# Patient Record
Sex: MALE | Race: BLACK OR AFRICAN AMERICAN | NOT HISPANIC OR LATINO
[De-identification: names, ages, dates, MRNs, and addresses within clinical notes are randomized per-mention and may not be internally consistent; named-entity substitution may affect disease eponyms.]

---

## 2017-06-01 ENCOUNTER — APPOINTMENT (OUTPATIENT)
Dept: INTERNAL MEDICINE | Facility: CLINIC | Age: 57
End: 2017-06-01

## 2017-06-01 VITALS
RESPIRATION RATE: 12 BRPM | WEIGHT: 164 LBS | OXYGEN SATURATION: 99 % | BODY MASS INDEX: 22.96 KG/M2 | HEIGHT: 71 IN | HEART RATE: 98 BPM | TEMPERATURE: 98.8 F | DIASTOLIC BLOOD PRESSURE: 88 MMHG | SYSTOLIC BLOOD PRESSURE: 138 MMHG

## 2017-06-01 DIAGNOSIS — K64.4 RESIDUAL HEMORRHOIDAL SKIN TAGS: ICD-10-CM

## 2017-08-04 ENCOUNTER — APPOINTMENT (OUTPATIENT)
Dept: GASTROENTEROLOGY | Facility: CLINIC | Age: 57
End: 2017-08-04

## 2017-08-04 DIAGNOSIS — M25.569 PAIN IN UNSPECIFIED KNEE: ICD-10-CM

## 2017-09-12 ENCOUNTER — APPOINTMENT (OUTPATIENT)
Dept: ORTHOPEDIC SURGERY | Facility: CLINIC | Age: 57
End: 2017-09-12

## 2019-11-14 ENCOUNTER — APPOINTMENT (OUTPATIENT)
Dept: INTERNAL MEDICINE | Facility: CLINIC | Age: 59
End: 2019-11-14
Payer: MEDICAID

## 2019-11-14 VITALS — SYSTOLIC BLOOD PRESSURE: 120 MMHG | DIASTOLIC BLOOD PRESSURE: 75 MMHG

## 2019-11-14 VITALS
WEIGHT: 166 LBS | SYSTOLIC BLOOD PRESSURE: 158 MMHG | RESPIRATION RATE: 12 BRPM | TEMPERATURE: 98.3 F | DIASTOLIC BLOOD PRESSURE: 78 MMHG | BODY MASS INDEX: 23.24 KG/M2 | HEIGHT: 71 IN | HEART RATE: 102 BPM | OXYGEN SATURATION: 99 %

## 2019-11-14 DIAGNOSIS — Z00.00 ENCOUNTER FOR GENERAL ADULT MEDICAL EXAMINATION W/OUT ABNORMAL FINDINGS: ICD-10-CM

## 2019-11-14 DIAGNOSIS — N48.9 DISORDER OF PENIS, UNSPECIFIED: ICD-10-CM

## 2019-11-14 PROCEDURE — G0008: CPT

## 2019-11-14 PROCEDURE — 99214 OFFICE O/P EST MOD 30 MIN: CPT | Mod: 25

## 2019-11-14 PROCEDURE — 90682 RIV4 VACC RECOMBINANT DNA IM: CPT

## 2019-11-14 NOTE — PHYSICAL EXAM
[No Acute Distress] : no acute distress [Well Nourished] : well nourished [Well Developed] : well developed [PERRL] : pupils equal round and reactive to light [Well-Appearing] : well-appearing [Normal Sclera/Conjunctiva] : normal sclera/conjunctiva [EOMI] : extraocular movements intact [Normal Outer Ear/Nose] : the outer ears and nose were normal in appearance [Normal Oropharynx] : the oropharynx was normal [No JVD] : no jugular venous distention [No Lymphadenopathy] : no lymphadenopathy [Supple] : supple [Thyroid Normal, No Nodules] : the thyroid was normal and there were no nodules present [No Respiratory Distress] : no respiratory distress  [No Accessory Muscle Use] : no accessory muscle use [Clear to Auscultation] : lungs were clear to auscultation bilaterally [Normal Rate] : normal rate  [Regular Rhythm] : with a regular rhythm [Normal S1, S2] : normal S1 and S2 [II] : a grade 2 [No Carotid Bruits] : no carotid bruits [No Varicosities] : no varicosities [No Abdominal Bruit] : a ~M bruit was not heard ~T in the abdomen [Pedal Pulses Present] : the pedal pulses are present [No Edema] : there was no peripheral edema [No Palpable Aorta] : no palpable aorta [Soft] : abdomen soft [No Extremity Clubbing/Cyanosis] : no extremity clubbing/cyanosis [Non-distended] : non-distended [Non Tender] : non-tender [No Masses] : no abdominal mass palpated [No HSM] : no HSM [Normal Bowel Sounds] : normal bowel sounds [Normal Posterior Cervical Nodes] : no posterior cervical lymphadenopathy [No CVA Tenderness] : no CVA  tenderness [Normal Anterior Cervical Nodes] : no anterior cervical lymphadenopathy [No Joint Swelling] : no joint swelling [No Spinal Tenderness] : no spinal tenderness [Grossly Normal Strength/Tone] : grossly normal strength/tone [Coordination Grossly Intact] : coordination grossly intact [No Rash] : no rash [No Focal Deficits] : no focal deficits [Deep Tendon Reflexes (DTR)] : deep tendon reflexes were 2+ and symmetric [Normal Gait] : normal gait [Normal Insight/Judgement] : insight and judgment were intact [Normal Affect] : the affect was normal

## 2019-11-25 ENCOUNTER — APPOINTMENT (OUTPATIENT)
Dept: UROLOGY | Facility: CLINIC | Age: 59
End: 2019-11-25

## 2019-12-09 ENCOUNTER — APPOINTMENT (OUTPATIENT)
Dept: UROLOGY | Facility: CLINIC | Age: 59
End: 2019-12-09

## 2020-03-16 ENCOUNTER — APPOINTMENT (OUTPATIENT)
Dept: INTERNAL MEDICINE | Facility: CLINIC | Age: 60
End: 2020-03-16

## 2020-11-04 ENCOUNTER — EMERGENCY (EMERGENCY)
Facility: HOSPITAL | Age: 60
LOS: 1 days | Discharge: HOME | End: 2020-11-04
Attending: EMERGENCY MEDICINE | Admitting: EMERGENCY MEDICINE
Payer: MEDICAID

## 2020-11-04 VITALS
SYSTOLIC BLOOD PRESSURE: 105 MMHG | DIASTOLIC BLOOD PRESSURE: 75 MMHG | OXYGEN SATURATION: 98 % | RESPIRATION RATE: 20 BRPM | HEART RATE: 115 BPM | TEMPERATURE: 98 F

## 2020-11-04 PROCEDURE — 99283 EMERGENCY DEPT VISIT LOW MDM: CPT | Mod: 25

## 2020-11-04 PROCEDURE — 12002 RPR S/N/AX/GEN/TRNK2.6-7.5CM: CPT

## 2020-11-04 NOTE — ED PROVIDER NOTE - PATIENT PORTAL LINK FT
You can access the FollowMyHealth Patient Portal offered by [facility] by registering at the following website: [email or web address]. By joining [employer]’s FollowMyHealth portal, you will also be able to view your health information using other applications (apps) compatible with our system.

## 2020-11-04 NOTE — ED PROVIDER NOTE - NSFOLLOWUPINSTRUCTIONS_ED_ALL_ED_FT
Laceration    A laceration is a cut that goes through all of the layers of the skin and into the tissue that is right under the skin. Some lacerations heal on their own. Others need to be closed with skin adhesive strips, skin glue, stitches (sutures), or staples. Proper laceration care minimizes the risk of infection and helps the laceration to heal better.  If non-absorbable stitches or staples have been placed, they must be taken out within the time frame instructed by your healthcare provider.    Instructions:   Keep area clean and dry. Do not shower for 24 hours.   Return to ED in 7-10days for removal.     SEEK IMMEDIATE MEDICAL CARE IF YOU HAVE ANY OF THE FOLLOWING SYMPTOMS: swelling around the wound, worsening pain, drainage from the wound, red streaking going away from your wound, inability to move finger or toe near the laceration, or discoloration of skin near the laceration.
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
## 2020-11-04 NOTE — ED PROVIDER NOTE - PROGRESS NOTE DETAILS
Danny: Wound irrigated and 3 staples put into place. S&S of infxns discussed along with dispo instructions.

## 2020-11-04 NOTE — ED PROVIDER NOTE - NS_ ATTENDINGSCRIBEDETAILS _ED_A_ED_FT
I saw and evaluated the patient myself. The scribe entered the note based on my observations and dictation. I have reviewed the scribe's note.

## 2020-11-19 ENCOUNTER — EMERGENCY (EMERGENCY)
Facility: HOSPITAL | Age: 60
LOS: 1 days | Discharge: HOME | End: 2020-11-19
Attending: EMERGENCY MEDICINE | Admitting: EMERGENCY MEDICINE
Payer: MEDICAID

## 2020-11-19 VITALS
HEART RATE: 96 BPM | RESPIRATION RATE: 17 BRPM | OXYGEN SATURATION: 100 % | DIASTOLIC BLOOD PRESSURE: 102 MMHG | SYSTOLIC BLOOD PRESSURE: 158 MMHG | TEMPERATURE: 98 F

## 2020-11-19 PROCEDURE — L9995: CPT
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
## 2020-11-19 NOTE — ED PROVIDER NOTE - PROGRESS NOTE DETAILS
Dr. Rubio: 3 aide removed from scalp. Area is clean and dry. No discharge or bleeding. Advised to keep area covered. Dr. Rubio: 3 aide removed from scalp. Area is clean and dry. No discharge or bleeding. Advised to keep area covered. Discussed monitoring BP and f/u with PMD.

## 2020-11-19 NOTE — ED PROVIDER NOTE - NSFOLLOWUPINSTRUCTIONS_ED_ALL_ED_FT
Follow up with your primary care doctor within 1 week  Wash area gently with warm soapy water  Return to the ER with any concerns, pain at site, pus drainage, fever or any other concerns. Follow up with your primary care doctor within 1 week, discuss elevated blood pressure  Wash area gently with warm soapy water  Return to the ER with any concerns, pain at site, pus drainage, fever or any other concerns.
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
## 2020-11-19 NOTE — ED PROVIDER NOTE - PATIENT PORTAL LINK FT
You can access the FollowMyHealth Patient Portal offered by [facility] by registering at the following website: [email or web address]. By joining [employer]’s FollowMyHealth portal, you will also be able to view your health information using other applications (apps) compatible with our system.

## 2020-11-19 NOTE — ED PROVIDER NOTE - CLINICAL SUMMARY MEDICAL DECISION MAKING FREE TEXT BOX
61 y/o male with no significant PMHx who presented to the ED for staple removal.   Staple Removal  Wound care

## 2021-04-20 ENCOUNTER — APPOINTMENT (OUTPATIENT)
Dept: INTERNAL MEDICINE | Facility: CLINIC | Age: 61
End: 2021-04-20

## 2021-04-27 ENCOUNTER — NON-APPOINTMENT (OUTPATIENT)
Age: 61
End: 2021-04-27

## 2021-04-27 ENCOUNTER — APPOINTMENT (OUTPATIENT)
Dept: INTERNAL MEDICINE | Facility: CLINIC | Age: 61
End: 2021-04-27
Payer: MEDICAID

## 2021-04-27 ENCOUNTER — LABORATORY RESULT (OUTPATIENT)
Age: 61
End: 2021-04-27

## 2021-04-27 VITALS
SYSTOLIC BLOOD PRESSURE: 163 MMHG | OXYGEN SATURATION: 97 % | RESPIRATION RATE: 12 BRPM | WEIGHT: 164 LBS | BODY MASS INDEX: 22.96 KG/M2 | DIASTOLIC BLOOD PRESSURE: 93 MMHG | HEART RATE: 96 BPM | HEIGHT: 71 IN | TEMPERATURE: 97.1 F

## 2021-04-27 VITALS — SYSTOLIC BLOOD PRESSURE: 135 MMHG | DIASTOLIC BLOOD PRESSURE: 80 MMHG

## 2021-04-27 DIAGNOSIS — G56.02 CARPAL TUNNEL SYNDROME, LEFT UPPER LIMB: ICD-10-CM

## 2021-04-27 DIAGNOSIS — E53.8 DEFICIENCY OF OTHER SPECIFIED B GROUP VITAMINS: ICD-10-CM

## 2021-04-27 PROCEDURE — 93000 ELECTROCARDIOGRAM COMPLETE: CPT

## 2021-04-27 PROCEDURE — 99072 ADDL SUPL MATRL&STAF TM PHE: CPT

## 2021-04-27 PROCEDURE — 99214 OFFICE O/P EST MOD 30 MIN: CPT | Mod: 25

## 2021-04-27 RX ORDER — PANTOPRAZOLE 40 MG/1
40 TABLET, DELAYED RELEASE ORAL DAILY
Qty: 30 | Refills: 3 | Status: DISCONTINUED | COMMUNITY
Start: 2017-10-11 | End: 2021-04-27

## 2021-04-27 RX ORDER — PRAMOXINE HYDROCHLORIDE AND HYDROCORTISONE ACETATE 10; 25 MG/G; MG/G
2.5-1 CREAM TOPICAL
Qty: 30 | Refills: 2 | Status: DISCONTINUED | COMMUNITY
Start: 2017-06-01 | End: 2021-04-27

## 2021-04-27 RX ORDER — FAMOTIDINE 20 MG/1
20 TABLET, FILM COATED ORAL
Qty: 60 | Refills: 2 | Status: DISCONTINUED | COMMUNITY
Start: 2017-06-01 | End: 2021-04-27

## 2021-04-27 NOTE — REVIEW OF SYSTEMS
[Shortness Of Breath] : shortness of breath [Dyspnea on Exertion] : dyspnea on exertion [Insomnia] : insomnia [Anxiety] : anxiety [Negative] : Heme/Lymph [Wheezing] : no wheezing [Cough] : no cough [Suicidal] : not suicidal [Depression] : no depression

## 2021-04-27 NOTE — PHYSICAL EXAM
[Well Nourished] : well nourished [Well Developed] : well developed [Well-Appearing] : well-appearing [Normal Sclera/Conjunctiva] : normal sclera/conjunctiva [PERRL] : pupils equal round and reactive to light [EOMI] : extraocular movements intact [Normal Outer Ear/Nose] : the outer ears and nose were normal in appearance [Normal Oropharynx] : the oropharynx was normal [No JVD] : no jugular venous distention [No Lymphadenopathy] : no lymphadenopathy [Supple] : supple [Thyroid Normal, No Nodules] : the thyroid was normal and there were no nodules present [No Respiratory Distress] : no respiratory distress  [No Accessory Muscle Use] : no accessory muscle use [Clear to Auscultation] : lungs were clear to auscultation bilaterally [Normal Rate] : normal rate  [Regular Rhythm] : with a regular rhythm [Normal S1, S2] : normal S1 and S2 [II] : a grade 2 [No Carotid Bruits] : no carotid bruits [No Abdominal Bruit] : a ~M bruit was not heard ~T in the abdomen [No Varicosities] : no varicosities [Pedal Pulses Present] : the pedal pulses are present [No Edema] : there was no peripheral edema [No Palpable Aorta] : no palpable aorta [No Extremity Clubbing/Cyanosis] : no extremity clubbing/cyanosis [Soft] : abdomen soft [Non Tender] : non-tender [Non-distended] : non-distended [No Masses] : no abdominal mass palpated [No HSM] : no HSM [Normal Bowel Sounds] : normal bowel sounds [Normal Posterior Cervical Nodes] : no posterior cervical lymphadenopathy [Normal Anterior Cervical Nodes] : no anterior cervical lymphadenopathy [No CVA Tenderness] : no CVA  tenderness [No Spinal Tenderness] : no spinal tenderness [No Joint Swelling] : no joint swelling [Grossly Normal Strength/Tone] : grossly normal strength/tone [No Rash] : no rash [Coordination Grossly Intact] : coordination grossly intact [No Focal Deficits] : no focal deficits [Normal Gait] : normal gait [Deep Tendon Reflexes (DTR)] : deep tendon reflexes were 2+ and symmetric [Normal Affect] : the affect was normal [Normal Insight/Judgement] : insight and judgment were intact

## 2021-04-28 ENCOUNTER — NON-APPOINTMENT (OUTPATIENT)
Age: 61
End: 2021-04-28

## 2021-04-28 DIAGNOSIS — R79.89 OTHER SPECIFIED ABNORMAL FINDINGS OF BLOOD CHEMISTRY: ICD-10-CM

## 2021-04-28 DIAGNOSIS — E55.9 VITAMIN D DEFICIENCY, UNSPECIFIED: ICD-10-CM

## 2021-04-28 DIAGNOSIS — D72.820 LYMPHOCYTOSIS (SYMPTOMATIC): ICD-10-CM

## 2021-04-28 LAB
25(OH)D3 SERPL-MCNC: 6 NG/ML
ALBUMIN SERPL ELPH-MCNC: 4.5 G/DL
ALP BLD-CCNC: 77 U/L
ALT SERPL-CCNC: 16 U/L
ANION GAP SERPL CALC-SCNC: 13 MMOL/L
APPEARANCE: CLEAR
AST SERPL-CCNC: 17 U/L
BILIRUB SERPL-MCNC: 0.2 MG/DL
BILIRUBIN URINE: NEGATIVE
BLOOD URINE: NEGATIVE
BUN SERPL-MCNC: 13 MG/DL
CALCIUM SERPL-MCNC: 10.3 MG/DL
CHLORIDE SERPL-SCNC: 104 MMOL/L
CHOLEST SERPL-MCNC: 162 MG/DL
CO2 SERPL-SCNC: 24 MMOL/L
COLOR: YELLOW
CREAT SERPL-MCNC: 1.23 MG/DL
CREAT SPEC-SCNC: 135 MG/DL
ESTIMATED AVERAGE GLUCOSE: 120 MG/DL
FERRITIN SERPL-MCNC: 161 NG/ML
FOLATE SERPL-MCNC: 11.3 NG/ML
GLUCOSE QUALITATIVE U: NEGATIVE
GLUCOSE SERPL-MCNC: 87 MG/DL
GLUCOSE SERPL-MCNC: 97 MG/DL
HBA1C MFR BLD HPLC: 5.8 %
HDLC SERPL-MCNC: 36 MG/DL
IRON SERPL-MCNC: 90 UG/DL
KETONES URINE: NEGATIVE
LDLC SERPL CALC-MCNC: 96 MG/DL
LEUKOCYTE ESTERASE URINE: NEGATIVE
MAGNESIUM SERPL-MCNC: 2.3 MG/DL
MICROALBUMIN 24H UR DL<=1MG/L-MCNC: 1.4 MG/DL
MICROALBUMIN/CREAT 24H UR-RTO: 10 MG/G
NITRITE URINE: NEGATIVE
NONHDLC SERPL-MCNC: 126 MG/DL
PH URINE: 6.5
POTASSIUM SERPL-SCNC: 4.6 MMOL/L
PROT SERPL-MCNC: 7.4 G/DL
PROTEIN URINE: NEGATIVE
PSA FREE FLD-MCNC: 33 %
PSA FREE SERPL-MCNC: 0.46 NG/ML
PSA SERPL-MCNC: 1.4 NG/ML
SODIUM SERPL-SCNC: 141 MMOL/L
SPECIFIC GRAVITY URINE: 1.02
TRIGL SERPL-MCNC: 154 MG/DL
TSH SERPL-ACNC: <0.01 UIU/ML
UROBILINOGEN URINE: NORMAL
VIT B12 SERPL-MCNC: 298 PG/ML

## 2021-04-29 LAB
T4 FREE SERPL-MCNC: 2.5 NG/DL
THYROGLOB AB SERPL-ACNC: 182 IU/ML
THYROPEROXIDASE AB SERPL IA-ACNC: 535 IU/ML

## 2021-05-04 LAB
BASOPHILS # BLD AUTO: 0.06 K/UL
BASOPHILS NFR BLD AUTO: 0.5 %
EOSINOPHIL # BLD AUTO: 0.29 K/UL
EOSINOPHIL NFR BLD AUTO: 2.6 %
HCT VFR BLD CALC: 50.5 %
HGB BLD-MCNC: 15.6 G/DL
IMM GRANULOCYTES NFR BLD AUTO: 0.3 %
LYMPHOCYTES # BLD AUTO: 6.01 K/UL
LYMPHOCYTES NFR BLD AUTO: 54.4 %
MAN DIFF?: NORMAL
MCHC RBC-ENTMCNC: 25.9 PG
MCHC RBC-ENTMCNC: 30.9 GM/DL
MCV RBC AUTO: 83.9 FL
MONOCYTES # BLD AUTO: 0.71 K/UL
MONOCYTES NFR BLD AUTO: 6.4 %
NEUTROPHILS # BLD AUTO: 3.95 K/UL
NEUTROPHILS NFR BLD AUTO: 35.8 %
PLATELET # BLD AUTO: 276 K/UL
RBC # BLD: 6.02 M/UL
RBC # FLD: 14.6 %
WBC # FLD AUTO: 11.05 K/UL

## 2021-05-12 ENCOUNTER — APPOINTMENT (OUTPATIENT)
Dept: CARDIOLOGY | Facility: CLINIC | Age: 61
End: 2021-05-12
Payer: MEDICAID

## 2021-05-12 VITALS
HEIGHT: 71 IN | WEIGHT: 162 LBS | RESPIRATION RATE: 12 BRPM | DIASTOLIC BLOOD PRESSURE: 84 MMHG | OXYGEN SATURATION: 99 % | TEMPERATURE: 96.9 F | HEART RATE: 85 BPM | SYSTOLIC BLOOD PRESSURE: 134 MMHG | BODY MASS INDEX: 22.68 KG/M2

## 2021-05-12 PROCEDURE — 99204 OFFICE O/P NEW MOD 45 MIN: CPT

## 2021-05-12 PROCEDURE — 99072 ADDL SUPL MATRL&STAF TM PHE: CPT

## 2021-05-12 NOTE — REVIEW OF SYSTEMS
[SOB] : shortness of breath [Dyspnea on exertion] : dyspnea during exertion [Chest Discomfort] : no chest discomfort [Lower Ext Edema] : no extremity edema [Palpitations] : no palpitations [Orthopnea] : no orthopnea [PND] : no PND [Syncope] : no syncope [Negative] : Heme/Lymph

## 2021-05-12 NOTE — HISTORY OF PRESENT ILLNESS
[FreeTextEntry1] : Sven is a 61-year-old gentleman smoker, dyslipidemia who presents for cardiac evaluation. Under stress with 90 year old mother who has a PEG. Works as  but not often because caring for her. Cut down on smoking and could not tolerate chantix. No regular exercise or activity. No chest pain or palpitaitons but short of breath on stairs.

## 2021-05-12 NOTE — DISCUSSION/SUMMARY
[FreeTextEntry1] : The patient is a 61-year-old gentleman smoker, dyslipidemia with dyspnea on exertion. \par #1 CV- echo and exercise stress test\par #2 Lipids- reviewed results, not smoking completely and regular daily exercise then repeat panel\par #3 General- stress management and help for his mother discussed, emotional support provided

## 2021-06-11 ENCOUNTER — APPOINTMENT (OUTPATIENT)
Dept: PULMONOLOGY | Facility: CLINIC | Age: 61
End: 2021-06-11
Payer: MEDICAID

## 2021-06-11 VITALS
RESPIRATION RATE: 12 BRPM | SYSTOLIC BLOOD PRESSURE: 150 MMHG | DIASTOLIC BLOOD PRESSURE: 73 MMHG | HEIGHT: 71 IN | TEMPERATURE: 97.5 F | OXYGEN SATURATION: 98 % | HEART RATE: 114 BPM | WEIGHT: 162 LBS | BODY MASS INDEX: 22.68 KG/M2

## 2021-06-11 DIAGNOSIS — R06.00 DYSPNEA, UNSPECIFIED: ICD-10-CM

## 2021-06-11 DIAGNOSIS — Z72.89 OTHER PROBLEMS RELATED TO LIFESTYLE: ICD-10-CM

## 2021-06-11 PROCEDURE — 99204 OFFICE O/P NEW MOD 45 MIN: CPT

## 2021-06-11 NOTE — REASON FOR VISIT
[Consultation] : a consultation [Cough] : cough [COPD] : COPD [Shortness of Breath] : shortness of breath [Wheezing] : wheezing

## 2021-06-11 NOTE — REVIEW OF SYSTEMS
[Cough] : cough [Hemoptysis] : no hemoptysis [Chest Tightness] : no chest tightness [Sputum] : sputum [Dyspnea] : dyspnea [Pleuritic Pain] : no pleuritic pain [A.M. Dry Mouth] : a.m. dry mouth [SOB on Exertion] : sob on exertion [Negative] : Endocrine

## 2021-06-11 NOTE — HISTORY OF PRESENT ILLNESS
[Current] : current [Never] : never [TextBox_4] : Patient is a 61-year-old male with past medical history significant for polysubstance abuse, active smoker, hypertension, GERD who presents today for pulmonary consult.  The patient complains of cough shortness of breath dyspnea on exertion with audible wheezing at times.  He also complains of nonrestorative sleep.  He denies fevers chills chest pain weight loss or hemoptysis [TextBox_11] : 1

## 2021-06-11 NOTE — ASSESSMENT
[FreeTextEntry1] : In summary the patient is a 61-year-old male with past medical history significant for hypertension, GERD, COPD, active smoker history of polysubstance abuse who presents today for pulmonary consult.  Patient's physical exam is significant for mild wheezing bilaterally.  Had a lengthy discussion with the patient regarding smoking cessation.  A CT of the chest for lung cancer screening has been ordered.  A pulmonary function test has been ordered.  The patient is now started on Breztri is instructed to follow-up in 2 weeks

## 2021-08-02 ENCOUNTER — APPOINTMENT (OUTPATIENT)
Dept: PULMONOLOGY | Facility: CLINIC | Age: 61
End: 2021-08-02
Payer: MEDICAID

## 2021-08-02 VITALS
OXYGEN SATURATION: 98 % | DIASTOLIC BLOOD PRESSURE: 90 MMHG | TEMPERATURE: 97.7 F | WEIGHT: 161 LBS | BODY MASS INDEX: 22.54 KG/M2 | RESPIRATION RATE: 14 BRPM | SYSTOLIC BLOOD PRESSURE: 143 MMHG | HEART RATE: 90 BPM | HEIGHT: 71 IN

## 2021-08-02 PROCEDURE — 99214 OFFICE O/P EST MOD 30 MIN: CPT

## 2021-08-02 NOTE — HISTORY OF PRESENT ILLNESS
[Current] : current [Never] : never [TextBox_4] : Patient is  a 61-year-old male with past medical history significant for COPD, hypertension, GERD who presents today for follow-up.  Patient had a recent CT chest which revealed a subsolid spiculated groundglass 1.5 cm nodule in the right upper lobe.  The patient is currently down to smoking 1 cigarette/day.  He denies any recent fevers chills chest pain weight loss or hemoptysis

## 2021-08-02 NOTE — ASSESSMENT
[FreeTextEntry1] : In summary patient is a 61-year-old male with COPD who recently was found to have a spiculated right upper lobe nodule.  The patient's physical exam is unchanged.  I had a lengthy discussion with patient regarding CT-guided needle biopsy versus video-assisted thoracoscopic surgery and resection.  The patient now agrees for VATS.  He is being referred and has an appointment for Dr. Jay Jay Luong at [facility] for further management

## 2021-08-18 ENCOUNTER — APPOINTMENT (OUTPATIENT)
Dept: PULMONOLOGY | Facility: CLINIC | Age: 61
End: 2021-08-18

## 2021-08-18 ENCOUNTER — APPOINTMENT (OUTPATIENT)
Dept: CARDIOLOGY | Facility: CLINIC | Age: 61
End: 2021-08-18

## 2021-08-25 ENCOUNTER — APPOINTMENT (OUTPATIENT)
Dept: PULMONOLOGY | Facility: CLINIC | Age: 61
End: 2021-08-25
Payer: MEDICAID

## 2021-08-25 VITALS
SYSTOLIC BLOOD PRESSURE: 129 MMHG | BODY MASS INDEX: 22.82 KG/M2 | WEIGHT: 163 LBS | RESPIRATION RATE: 14 BRPM | OXYGEN SATURATION: 98 % | HEIGHT: 71 IN | TEMPERATURE: 97.8 F | DIASTOLIC BLOOD PRESSURE: 89 MMHG | HEART RATE: 90 BPM

## 2021-08-25 DIAGNOSIS — Z87.898 PERSONAL HISTORY OF OTHER SPECIFIED CONDITIONS: ICD-10-CM

## 2021-08-25 DIAGNOSIS — Z72.0 TOBACCO USE: ICD-10-CM

## 2021-08-25 PROCEDURE — 94060 EVALUATION OF WHEEZING: CPT

## 2021-08-25 PROCEDURE — 94729 DIFFUSING CAPACITY: CPT

## 2021-08-25 PROCEDURE — 99213 OFFICE O/P EST LOW 20 MIN: CPT | Mod: 25

## 2021-08-25 PROCEDURE — 94726 PLETHYSMOGRAPHY LUNG VOLUMES: CPT

## 2021-08-26 ENCOUNTER — APPOINTMENT (OUTPATIENT)
Dept: THORACIC SURGERY | Facility: CLINIC | Age: 61
End: 2021-08-26
Payer: MEDICAID

## 2021-08-26 PROBLEM — Z87.898 HISTORY OF DRUG USE: Status: ACTIVE | Noted: 2021-06-11

## 2021-08-26 NOTE — HISTORY OF PRESENT ILLNESS
[Current] : current [Never] : never [TextBox_4] : Patient is a 61-year-old male with COPD who was recently found to have a right upper lobe nodule chest and is currently being prepared for video-assisted thoracoscopic surgery and resection.  The patient is scheduled to be evaluated by Dr. Luong in the a.m.  He currently denies fevers chills chest pain weight loss or

## 2021-08-26 NOTE — ASSESSMENT
[FreeTextEntry1] : Is a 61-year-old male with past medical history significant for COPD found to have an abnormal CT chest with a right upper lobe nodule now is being worked up for video-assisted thoracoscopic surgery and resection.  The patient's physical exam is significant for scattered rhonchi bilaterally.  A pulmonary function test has been ordered and the patient is instructed to follow-up in 3 months

## 2021-08-26 NOTE — REASON FOR VISIT
[Follow-Up] : a follow-up visit [Abnormal CXR/ Chest CT] : an abnormal CXR/ chest CT [Cough] : cough [COPD] : COPD [Shortness of Breath] : shortness of breath

## 2021-08-26 NOTE — PHYSICAL EXAM
[No Acute Distress] : no acute distress [Normal Oropharynx] : normal oropharynx [Normal Appearance] : normal appearance [No Neck Mass] : no neck mass [Normal Rate/Rhythm] : normal rate/rhythm [Normal S1, S2] : normal s1, s2 [No Murmurs] : no murmurs [No Resp Distress] : no resp distress [Clear to Auscultation Bilaterally] : clear to auscultation bilaterally [No Abnormalities] : no abnormalities Patient/Caregiver provided printed discharge information. [Benign] : benign [Normal Gait] : normal gait [No Clubbing] : no clubbing [No Cyanosis] : no cyanosis [No Edema] : no edema [FROM] : FROM [Normal Color/ Pigmentation] : normal color/ pigmentation [No Focal Deficits] : no focal deficits [Oriented x3] : oriented x3 [Normal Affect] : normal affect

## 2021-09-09 ENCOUNTER — APPOINTMENT (OUTPATIENT)
Dept: THORACIC SURGERY | Facility: CLINIC | Age: 61
End: 2021-09-09
Payer: MEDICAID

## 2021-09-09 VITALS
DIASTOLIC BLOOD PRESSURE: 84 MMHG | BODY MASS INDEX: 21.7 KG/M2 | TEMPERATURE: 96.2 F | HEART RATE: 89 BPM | HEIGHT: 71 IN | OXYGEN SATURATION: 100 % | SYSTOLIC BLOOD PRESSURE: 131 MMHG | WEIGHT: 155 LBS

## 2021-09-09 PROCEDURE — 99203 OFFICE O/P NEW LOW 30 MIN: CPT

## 2021-09-09 NOTE — PHYSICAL EXAM
[General Appearance - Alert] : alert [General Appearance - In No Acute Distress] : in no acute distress [General Appearance - Well Nourished] : well nourished [General Appearance - Well Developed] : well developed [Sclera] : the sclera and conjunctiva were normal [Extraocular Movements] : extraocular movements were intact [Neck Appearance] : the appearance of the neck was normal [] : no respiratory distress [Respiration, Rhythm And Depth] : normal respiratory rhythm and effort [Examination Of The Chest] : the chest was normal in appearance [Abnormal Walk] : normal gait [Musculoskeletal - Swelling] : no joint swelling seen [Skin Turgor] : normal skin turgor [Skin Color & Pigmentation] : normal skin color and pigmentation [No Focal Deficits] : no focal deficits

## 2021-09-10 NOTE — ASSESSMENT
[FreeTextEntry1] : 61 year old male, current smoker, with a PMHx of COPD, HTN, GERD, HLD, with recent CT chest revealing spiculated right upper lobe lung nodule. He presents today for an initial evaluation. He is referred by Dr. Bradley. \par \par Patient presented to pulmonologist for progressive SOB, worse walking more than 5 blocks and with lying flat. He cut down to 1-2 cigarettes. 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 \par \par CT chest completed on 7/26/21 reviewed with patient. Base on smoking hx, size of nodule, lung nodule with 50% chance of malignancy. Will order PET further evaluate extent of disease. 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 He will return after PET to discuss next steps. \par \par I have reviewed the patient's medical records and diagnostic images at the time of this office consultation and have made the following recommendation.\par Plan:\par 1. PET\par 2.  RTC to discuss next steps\par \par The patient was advised on the benefits of smoking cessation, and risk reduction in regards to surgery and the risk of pneumonia.  Based on review of the imaging, and the pretest probability of malignancy being approximately 50%, I have recommended a whole-body PET scan and follow-up, subsequent to that.  I am concerned about the risk of malignancy.  If there is evidence that the nodule is improving and/or resolved, then observation would be warranted.  If, however, the nodule is present and/or PET avid, then consideration for biopsy versus wedge resection, possible segmentectomy, possible intraoperative frozen section with lobectomy and lymph node dissection would be warranted.  The patient understands and agrees to the above.

## 2021-09-10 NOTE — END OF VISIT
[Time Spent: ___ minutes] : I have spent [unfilled] minutes of time on the encounter. [FreeTextEntry3] : I, KRISTINA KING , am scribing for and in the presence of ISADORA OAKES the following sections: history of present illness, past medical/family/surgical/family/social history, review of systems, vital signs, physical exam, and disposition.\par

## 2021-09-10 NOTE — HISTORY OF PRESENT ILLNESS
[FreeTextEntry1] : 61 year old male, current smoker, with a PMHx of COPD, HTN, GERD, HLD, with recent CT chest revealing spiculated right upper lobe lung nodule. He presents today for an initial evaluation. He is referred by Dr. Bradley. \par \par CT chest completed on 7/26/21:\par - mild emphysema\par - spiculated predominantly groundglass 1.5 cm nodule in the right upper lobe with a 4 mm solid center series 3 image 66\par - atelectasis and scarring in the lung bases\par \par PFT 8/25/21\par - FVC 3.7, 74%\par - FEV1 2.82, 75%\par - DLCO 55%

## 2021-09-14 ENCOUNTER — OUTPATIENT (OUTPATIENT)
Dept: OUTPATIENT SERVICES | Facility: HOSPITAL | Age: 61
LOS: 1 days | End: 2021-09-14
Payer: COMMERCIAL

## 2021-09-14 LAB — GLUCOSE BLDC GLUCOMTR-MCNC: 94 MG/DL — SIGNIFICANT CHANGE UP (ref 70–99)

## 2021-09-14 PROCEDURE — 82962 GLUCOSE BLOOD TEST: CPT

## 2021-09-14 PROCEDURE — 78815 PET IMAGE W/CT SKULL-THIGH: CPT | Mod: 26

## 2021-09-14 PROCEDURE — A9552: CPT

## 2021-09-14 PROCEDURE — 78815 PET IMAGE W/CT SKULL-THIGH: CPT

## 2021-09-16 ENCOUNTER — APPOINTMENT (OUTPATIENT)
Dept: THORACIC SURGERY | Facility: CLINIC | Age: 61
End: 2021-09-16
Payer: MEDICAID

## 2021-09-16 VITALS
TEMPERATURE: 97 F | WEIGHT: 155 LBS | OXYGEN SATURATION: 96 % | HEART RATE: 96 BPM | BODY MASS INDEX: 21.7 KG/M2 | SYSTOLIC BLOOD PRESSURE: 135 MMHG | RESPIRATION RATE: 17 BRPM | DIASTOLIC BLOOD PRESSURE: 78 MMHG | HEIGHT: 71 IN

## 2021-09-16 DIAGNOSIS — N42.9 DISORDER OF PROSTATE, UNSPECIFIED: ICD-10-CM

## 2021-09-16 DIAGNOSIS — E01.0 IODINE-DEFICIENCY RELATED DIFFUSE (ENDEMIC) GOITER: ICD-10-CM

## 2021-09-16 PROCEDURE — 99213 OFFICE O/P EST LOW 20 MIN: CPT

## 2021-09-16 NOTE — ASSESSMENT
[FreeTextEntry1] : The patient was successful in discontinuation of cigarette consumption.  He underwent PET scan which demonstrated that the nodule in the right upper lobe decreased in size to 0.8 cm and did not demonstrate significant FDG uptake.  There was no pathologic mediastinal lymphadenopathy.\par \par I have reviewed the CT scan as well as the PET/CT scan.  On my review, I do not think the lesion increased in size, and may have decreased, but certainly is stable to decreased in size.  I believe that based on the patient's risk factors, that a CT-guided biopsy of the right upper lobe would be warranted versus observation with repeat CT scan of the chest in 3 months.  We have elected to proceed with CT-guided biopsy.\par \par If biopsy demonstrates suspicious and/or malignancy, then intervention with surgery would be warranted.  If not, then we would proceed with repeat CT scan, provided the patient remains asymptomatic.\par \par In addition to the lung findings on PET scan, I discussed the remaining findings including the thyroid and prostate.  The patient states that he has been evaluated previously for both organs, but wishes to be evaluated by people affiliated with our health system.  Therefore, he will be sent to urology with Dr. Dejan Crenshaw, and head and neck surgery with Dr. Jacob Hernandez.  Arrangements will be made.\par \par The patient will follow up after completion of the above.

## 2021-09-30 ENCOUNTER — LABORATORY RESULT (OUTPATIENT)
Age: 61
End: 2021-09-30

## 2021-10-13 ENCOUNTER — RESULT REVIEW (OUTPATIENT)
Age: 61
End: 2021-10-13

## 2021-10-13 ENCOUNTER — APPOINTMENT (OUTPATIENT)
Dept: CT IMAGING | Facility: HOSPITAL | Age: 61
End: 2021-10-13

## 2021-10-13 ENCOUNTER — APPOINTMENT (OUTPATIENT)
Dept: INTERVENTIONAL RADIOLOGY/VASCULAR | Facility: HOSPITAL | Age: 61
End: 2021-10-13

## 2021-10-13 ENCOUNTER — OUTPATIENT (OUTPATIENT)
Dept: OUTPATIENT SERVICES | Facility: HOSPITAL | Age: 61
LOS: 1 days | End: 2021-10-13
Payer: COMMERCIAL

## 2021-10-13 PROCEDURE — 32408 CORE NDL BX LNG/MED PERQ: CPT

## 2021-10-13 PROCEDURE — 88341 IMHCHEM/IMCYTCHM EA ADD ANTB: CPT | Mod: 26

## 2021-10-13 PROCEDURE — C1769: CPT

## 2021-10-13 PROCEDURE — 88305 TISSUE EXAM BY PATHOLOGIST: CPT

## 2021-10-13 PROCEDURE — 88341 IMHCHEM/IMCYTCHM EA ADD ANTB: CPT

## 2021-10-13 PROCEDURE — 71045 X-RAY EXAM CHEST 1 VIEW: CPT

## 2021-10-13 PROCEDURE — 71045 X-RAY EXAM CHEST 1 VIEW: CPT | Mod: 26

## 2021-10-13 PROCEDURE — 88305 TISSUE EXAM BY PATHOLOGIST: CPT | Mod: 26

## 2021-10-13 PROCEDURE — 88342 IMHCHEM/IMCYTCHM 1ST ANTB: CPT | Mod: 26

## 2021-10-18 DIAGNOSIS — R91.1 SOLITARY PULMONARY NODULE: ICD-10-CM

## 2021-10-18 DIAGNOSIS — Z98.890 OTHER SPECIFIED POSTPROCEDURAL STATES: ICD-10-CM

## 2021-10-18 DIAGNOSIS — Z87.891 PERSONAL HISTORY OF NICOTINE DEPENDENCE: ICD-10-CM

## 2021-10-21 ENCOUNTER — APPOINTMENT (OUTPATIENT)
Dept: THORACIC SURGERY | Facility: CLINIC | Age: 61
End: 2021-10-21
Payer: MEDICAID

## 2021-10-21 PROCEDURE — 99214 OFFICE O/P EST MOD 30 MIN: CPT

## 2021-10-21 NOTE — HISTORY OF PRESENT ILLNESS
[FreeTextEntry1] : 61 year old male, current smoker, with a PMHx of COPD, HTN, GERD, HLD, with recent CT chest revealing spiculated right upper lobe lung nodule. He presents today after undergoing a CT guided or a visit with a PET CT. He is referred by Dr. Bradley. \par \par CT chest completed on 7/26/21:\par - mild emphysema\par - spiculated predominantly groundglass 1.5 cm nodule in the right upper lobe with a 4 mm solid center series 3 image 66\par - atelectasis and scarring in the lung bases\par \par PFT 8/25/21\par - FVC 3.7, 74%\par - FEV1 2.82, 75%\par - DLCO 55% \par \par PET CT completed on 09/14/21:\par -0.8cm groundglass nodule in the right upper lobe which does not demonstrate significant FDG uptake, likely below PET resolution. No mediastinal lymphadenopathy\par -mild right greater than left FDG uptake within the peripheral zone of the prostate. Correlation with PSA is recommended\par -mild thyromegaly with FDG uptake. Correlation with thyroid ultrasound is recommended. \par \par CT guided biopsy of the RUL nodule completed on 10/15/21:\par -Adenocarcinoma; immunohistochemistry positive for TTF1 and Napsin, confirming lung primary \par

## 2021-10-21 NOTE — ASSESSMENT
[FreeTextEntry1] : The patient is status post percutaneous biopsy of the lung which revealed adenocarcinoma.\par \par PD–L1 less than 1%.  Immunohistochemistry showed the tumor to be positive for TTF-1 and Napsin, confirming lung primary.\par \par PET scan performed on 9/10/2021 revealed 0.8 cm groundglass nodule in the right upper lobe without significant FDG uptake.  This was the lesion that was percutaneously biopsied.\par \par The patient will be sent for a dedicated CT scan with IV contrast to assess segmental anatomy.  He will be sent for a quantitative VQ scan.  We have discussed the possibility of segmentectomy versus lobectomy, and have discussed the implications of either approach and the results of the scans to determine whether or not there is a component of heterogeneous emphysema which may lead us to perform lobectomy should there be minimal perfusion to the upper lobe.  Based on the size of the lesion, its morphology and the absence of pathologic mediastinal lymphadenopathy on clinical staging, there is no current role for invasive mediastinal staging.\par \par The patient was counseled on the risks, benefits and alternatives of proceeding with lung resection, possible segmentectomy versus right upper lobectomy. Specifically, the risk of bleeding, need for a blood transfusion, DVT, pulmonary embolism, pneumonia, postoperative respiratory insufficiency, postoperative ventilator support, as well as prolonged air leak, need for chest drainage, dysrhythmia, myocardial infarction, postoperative pain syndrome, need for adjuvant therapy, risk of recurrence, need for surveillance, conversion to an open procedure, as well as mortality was discussed with the patient who understands and agrees to the above.\par \par The patient will require\par 1.  CT scan of the chest with IV contrast, segmentectomy protocol\par 2.  Quantitative VQ scan\par 3.  Medical evaluation, optimization and clearance\par 4.  Schedule for bronchoscopy, robotic assisted right video-assisted thoracic surgery, segmentectomy of the right upper lobe, possible right upper lobectomy with mediastinal and hilar lymph node dissection.\par 5.  Follow-up with head and neck surgery and urology in regards to previous findings on PET scan which were reiterated with the patient.

## 2021-10-27 ENCOUNTER — NON-APPOINTMENT (OUTPATIENT)
Age: 61
End: 2021-10-27

## 2021-10-27 ENCOUNTER — APPOINTMENT (OUTPATIENT)
Dept: PULMONOLOGY | Facility: CLINIC | Age: 61
End: 2021-10-27
Payer: MEDICAID

## 2021-10-27 VITALS
OXYGEN SATURATION: 97 % | WEIGHT: 158 LBS | HEIGHT: 71 IN | HEART RATE: 95 BPM | BODY MASS INDEX: 22.12 KG/M2 | SYSTOLIC BLOOD PRESSURE: 160 MMHG | TEMPERATURE: 97.1 F | RESPIRATION RATE: 12 BRPM | DIASTOLIC BLOOD PRESSURE: 75 MMHG

## 2021-10-27 DIAGNOSIS — K21.9 GASTRO-ESOPHAGEAL REFLUX DISEASE W/OUT ESOPHAGITIS: ICD-10-CM

## 2021-10-27 DIAGNOSIS — K63.5 POLYP OF COLON: ICD-10-CM

## 2021-10-27 DIAGNOSIS — E78.5 HYPERLIPIDEMIA, UNSPECIFIED: ICD-10-CM

## 2021-10-27 PROCEDURE — 99214 OFFICE O/P EST MOD 30 MIN: CPT | Mod: 25

## 2021-10-27 PROCEDURE — 93000 ELECTROCARDIOGRAM COMPLETE: CPT

## 2021-10-27 NOTE — REASON FOR VISIT
[Pre-op Risk Stratification] : pre-op risk stratification [TextBox_44] : Patient scheduled for VATS and resection

## 2021-10-27 NOTE — ASSESSMENT
[FreeTextEntry1] : In summary the patient is a 61-year-old male with COPD recently diagnosed with adenocarcinoma of the lung who is now scheduled for video-assisted thoracoscopic surgery and resection.  Patient's physical exam is within normal limits.  Electrocardiogram revealed no acute ischemic changes.\par \par The patient is currently in his usual state of health and is medically cleared for this elective surgical procedure

## 2021-11-01 ENCOUNTER — APPOINTMENT (OUTPATIENT)
Dept: DISASTER EMERGENCY | Facility: CLINIC | Age: 61
End: 2021-11-01

## 2021-11-01 LAB
ALBUMIN SERPL ELPH-MCNC: 4.4 G/DL
ALP BLD-CCNC: 73 U/L
ALT SERPL-CCNC: 12 U/L
ANION GAP SERPL CALC-SCNC: 12 MMOL/L
APPEARANCE: CLEAR
AST SERPL-CCNC: 13 U/L
BACTERIA: NEGATIVE
BASOPHILS # BLD AUTO: 0.06 K/UL
BASOPHILS NFR BLD AUTO: 0.6 %
BILIRUB SERPL-MCNC: <0.2 MG/DL
BILIRUBIN URINE: NEGATIVE
BLOOD URINE: NEGATIVE
BUN SERPL-MCNC: 14 MG/DL
CALCIUM SERPL-MCNC: 9.8 MG/DL
CHLORIDE SERPL-SCNC: 107 MMOL/L
CO2 SERPL-SCNC: 18 MMOL/L
COLOR: NORMAL
CREAT SERPL-MCNC: 0.76 MG/DL
EOSINOPHIL # BLD AUTO: 0.22 K/UL
EOSINOPHIL NFR BLD AUTO: 2.3 %
GLUCOSE QUALITATIVE U: NEGATIVE
GLUCOSE SERPL-MCNC: 93 MG/DL
HCT VFR BLD CALC: 51.7 %
HGB BLD-MCNC: 16.1 G/DL
HYALINE CASTS: 0 /LPF
IMM GRANULOCYTES NFR BLD AUTO: 0.2 %
INR PPP: 0.96 RATIO
KETONES URINE: NEGATIVE
LEUKOCYTE ESTERASE URINE: NEGATIVE
LYMPHOCYTES # BLD AUTO: 4.44 K/UL
LYMPHOCYTES NFR BLD AUTO: 46 %
MAN DIFF?: NORMAL
MCHC RBC-ENTMCNC: 25.3 PG
MCHC RBC-ENTMCNC: 31.1 GM/DL
MCV RBC AUTO: 81.3 FL
MICROSCOPIC-UA: NORMAL
MONOCYTES # BLD AUTO: 0.87 K/UL
MONOCYTES NFR BLD AUTO: 9 %
NEUTROPHILS # BLD AUTO: 4.05 K/UL
NEUTROPHILS NFR BLD AUTO: 41.9 %
NITRITE URINE: NEGATIVE
PH URINE: 5.5
PLATELET # BLD AUTO: 278 K/UL
POTASSIUM SERPL-SCNC: 4.2 MMOL/L
PROT SERPL-MCNC: 7.7 G/DL
PROTEIN URINE: NEGATIVE
PT BLD: 11.4 SEC
RBC # BLD: 6.36 M/UL
RBC # FLD: 14.6 %
RED BLOOD CELLS URINE: 1 /HPF
SODIUM SERPL-SCNC: 138 MMOL/L
SPECIFIC GRAVITY URINE: 1.02
SQUAMOUS EPITHELIAL CELLS: 0 /HPF
UROBILINOGEN URINE: NORMAL
WBC # FLD AUTO: 9.66 K/UL
WHITE BLOOD CELLS URINE: 0 /HPF

## 2021-11-02 LAB — SARS-COV-2 N GENE NPH QL NAA+PROBE: NOT DETECTED

## 2021-11-04 ENCOUNTER — OUTPATIENT (OUTPATIENT)
Dept: OUTPATIENT SERVICES | Facility: HOSPITAL | Age: 61
LOS: 1 days | End: 2021-11-04
Payer: COMMERCIAL

## 2021-11-04 PROCEDURE — 78598 LUNG PERF&VENTILAT DIFERENTL: CPT | Mod: 26

## 2021-11-04 PROCEDURE — A9540: CPT

## 2021-11-04 PROCEDURE — 78598 LUNG PERF&VENTILAT DIFERENTL: CPT

## 2021-11-04 PROCEDURE — A9567: CPT

## 2021-11-05 ENCOUNTER — APPOINTMENT (OUTPATIENT)
Dept: CT IMAGING | Facility: HOSPITAL | Age: 61
End: 2021-11-05
Payer: MEDICAID

## 2021-11-05 ENCOUNTER — OUTPATIENT (OUTPATIENT)
Dept: OUTPATIENT SERVICES | Facility: HOSPITAL | Age: 61
LOS: 1 days | End: 2021-11-05
Payer: COMMERCIAL

## 2021-11-05 PROCEDURE — 71275 CT ANGIOGRAPHY CHEST: CPT

## 2021-11-05 PROCEDURE — 71275 CT ANGIOGRAPHY CHEST: CPT | Mod: 26

## 2021-11-07 ENCOUNTER — APPOINTMENT (OUTPATIENT)
Dept: DISASTER EMERGENCY | Facility: CLINIC | Age: 61
End: 2021-11-07

## 2021-11-08 LAB — SARS-COV-2 N GENE NPH QL NAA+PROBE: NOT DETECTED

## 2021-11-09 ENCOUNTER — TRANSCRIPTION ENCOUNTER (OUTPATIENT)
Age: 61
End: 2021-11-09

## 2021-11-09 ENCOUNTER — NON-APPOINTMENT (OUTPATIENT)
Age: 61
End: 2021-11-09

## 2021-11-09 VITALS
TEMPERATURE: 97 F | HEIGHT: 71 IN | WEIGHT: 158.07 LBS | SYSTOLIC BLOOD PRESSURE: 160 MMHG | OXYGEN SATURATION: 97 % | RESPIRATION RATE: 18 BRPM | DIASTOLIC BLOOD PRESSURE: 75 MMHG | HEART RATE: 95 BPM

## 2021-11-09 NOTE — H&P ADULT - HISTORY OF PRESENT ILLNESS
61 year old male, current smoker, with a PMHx of COPD, HTN, GERD, HLD, with recent CT chest revealing spiculated right upper lobe lung nodule. He presents today after undergoing a CT guided or a visit with a PET CT. He is referred by Dr. Bradley.     CT chest completed on 7/26/21:  - mild emphysema  - spiculated predominantly groundglass 1.5 cm nodule in the right upper lobe with a 4 mm solid center series 3 image 66  - atelectasis and scarring in the lung bases    PFT 8/25/21  - FVC 3.7, 74%  - FEV1 2.82, 75%  - DLCO 55%     PET CT completed on 09/14/21:  -0.8cm groundglass nodule in the right upper lobe which does not demonstrate significant FDG uptake, likely below PET resolution. No mediastinal lymphadenopathy  -mild right greater than left FDG uptake within the peripheral zone of the prostate. Correlation with PSA is recommended  -mild thyromegaly with FDG uptake. Correlation with thyroid ultrasound is recommended.     CT guided biopsy of the RUL nodule completed on 10/15/21:  -Adenocarcinoma; immunohistochemistry positive for TTF1 and Napsin, confirming lung primary

## 2021-11-09 NOTE — H&P ADULT - REASON FOR ADMISSION
Elective: Bronchoscopy, RIGHT VATS robotic assisted right upper segmentectomy, intraoperative frozen section, possible lobectomy with MLND

## 2021-11-09 NOTE — H&P ADULT - ASSESSMENT
The patient is status post percutaneous biopsy of the lung which revealed adenocarcinoma.    PD–L1 less than 1%. Immunohistochemistry showed the tumor to be positive for TTF-1 and Napsin, confirming lung primary.    PET scan performed on 9/10/2021 revealed 0.8 cm groundglass nodule in the right upper lobe without significant FDG uptake. This was the lesion that was percutaneously biopsied.    The patient will be sent for a dedicated CT scan with IV contrast to assess segmental anatomy. He will be sent for a quantitative VQ scan. We have discussed the possibility of segmentectomy versus lobectomy, and have discussed the implications of either approach and the results of the scans to determine whether or not there is a component of heterogeneous emphysema which may lead us to perform lobectomy should there be minimal perfusion to the upper lobe. Based on the size of the lesion, its morphology and the absence of pathologic mediastinal lymphadenopathy on clinical staging, there is no current role for invasive mediastinal staging.    The patient was counseled on the risks, benefits and alternatives of proceeding with lung resection, possible segmentectomy versus right upper lobectomy. Specifically, the risk of bleeding, need for a blood transfusion, DVT, pulmonary embolism, pneumonia, postoperative respiratory insufficiency, postoperative ventilator support, as well as prolonged air leak, need for chest drainage, dysrhythmia, myocardial infarction, postoperative pain syndrome, need for adjuvant therapy, risk of recurrence, need for surveillance, conversion to an open procedure, as well as mortality was discussed with the patient who understands and agrees to the above.    The patient will require  1. Medical evaluation, optimization and clearance  2. Schedule for bronchoscopy, robotic assisted right video-assisted thoracic surgery, segmentectomy of the right upper lobe, possible right upper lobectomy with mediastinal and hilar lymph node dissection.

## 2021-11-09 NOTE — H&P ADULT - NSHPLABSRESULTS_GEN_ALL_CORE
CT chest completed on 7/26/21:  - mild emphysema  - spiculated predominantly groundglass 1.5 cm nodule in the right upper lobe with a 4 mm solid center series 3 image 66  - atelectasis and scarring in the lung bases    PFT 8/25/21  - FVC 3.7, 74%  - FEV1 2.82, 75%  - DLCO 55%     PET CT completed on 09/14/21:  -0.8cm groundglass nodule in the right upper lobe which does not demonstrate significant FDG uptake, likely below PET resolution. No mediastinal lymphadenopathy  -mild right greater than left FDG uptake within the peripheral zone of the prostate. Correlation with PSA is recommended  -mild thyromegaly with FDG uptake. Correlation with thyroid ultrasound is recommended.     CT guided biopsy of the RUL nodule completed on 10/15/21:  -Adenocarcinoma; immunohistochemistry positive for TTF1 and Napsin, confirming lung primary    CTA chest completed on 10/21/21:  -A 16 x 12 mm part solid nodule with a 6 mm solid component in the right upper lobe is consistent with adenocarcinoma. MPVR images produced for preoperative planning.  - Severe emphysema.    NM Quantitative Scan completed on 10/21/21:  -No evidence of pulmonary embolism. The right lung accounts for approximately 50% of the total lung activity. The right upper lung accounts for 15% of the total lung activity and 30% of the right lung activity.

## 2021-11-10 ENCOUNTER — APPOINTMENT (OUTPATIENT)
Dept: THORACIC SURGERY | Facility: HOSPITAL | Age: 61
End: 2021-11-10

## 2021-11-10 ENCOUNTER — RESULT REVIEW (OUTPATIENT)
Age: 61
End: 2021-11-10

## 2021-11-10 ENCOUNTER — INPATIENT (INPATIENT)
Facility: HOSPITAL | Age: 61
LOS: 3 days | Discharge: HOME HEALTH | DRG: 164 | End: 2021-11-14
Attending: SPECIALIST | Admitting: SPECIALIST
Payer: COMMERCIAL

## 2021-11-10 LAB
ANION GAP SERPL CALC-SCNC: 10 MMOL/L — SIGNIFICANT CHANGE UP (ref 5–17)
APTT BLD: 34.5 SEC — SIGNIFICANT CHANGE UP (ref 27.5–35.5)
APTT BLD: 37.9 SEC — HIGH (ref 27.5–35.5)
BASOPHILS # BLD AUTO: 0.02 K/UL — SIGNIFICANT CHANGE UP (ref 0–0.2)
BASOPHILS NFR BLD AUTO: 0.3 % — SIGNIFICANT CHANGE UP (ref 0–2)
BLD GP AB SCN SERPL QL: NEGATIVE — SIGNIFICANT CHANGE UP
BUN SERPL-MCNC: 16 MG/DL — SIGNIFICANT CHANGE UP (ref 7–23)
CALCIUM SERPL-MCNC: 8.7 MG/DL — SIGNIFICANT CHANGE UP (ref 8.4–10.5)
CHLORIDE SERPL-SCNC: 109 MMOL/L — HIGH (ref 96–108)
CO2 SERPL-SCNC: 19 MMOL/L — LOW (ref 22–31)
CREAT SERPL-MCNC: 0.97 MG/DL — SIGNIFICANT CHANGE UP (ref 0.5–1.3)
EOSINOPHIL # BLD AUTO: 0.01 K/UL — SIGNIFICANT CHANGE UP (ref 0–0.5)
EOSINOPHIL NFR BLD AUTO: 0.1 % — SIGNIFICANT CHANGE UP (ref 0–6)
GLUCOSE BLDC GLUCOMTR-MCNC: 119 MG/DL — HIGH (ref 70–99)
GLUCOSE BLDC GLUCOMTR-MCNC: 167 MG/DL — HIGH (ref 70–99)
GLUCOSE SERPL-MCNC: 142 MG/DL — HIGH (ref 70–99)
HCT VFR BLD CALC: 40.1 % — SIGNIFICANT CHANGE UP (ref 39–50)
HGB BLD-MCNC: 12.7 G/DL — LOW (ref 13–17)
IMM GRANULOCYTES NFR BLD AUTO: 0.3 % — SIGNIFICANT CHANGE UP (ref 0–1.5)
INR BLD: 1.1 — SIGNIFICANT CHANGE UP (ref 0.88–1.16)
LYMPHOCYTES # BLD AUTO: 1.35 K/UL — SIGNIFICANT CHANGE UP (ref 1–3.3)
LYMPHOCYTES # BLD AUTO: 17.7 % — SIGNIFICANT CHANGE UP (ref 13–44)
MCHC RBC-ENTMCNC: 24.9 PG — LOW (ref 27–34)
MCHC RBC-ENTMCNC: 31.7 GM/DL — LOW (ref 32–36)
MCV RBC AUTO: 78.6 FL — LOW (ref 80–100)
MONOCYTES # BLD AUTO: 0.11 K/UL — SIGNIFICANT CHANGE UP (ref 0–0.9)
MONOCYTES NFR BLD AUTO: 1.4 % — LOW (ref 2–14)
NEUTROPHILS # BLD AUTO: 6.1 K/UL — SIGNIFICANT CHANGE UP (ref 1.8–7.4)
NEUTROPHILS NFR BLD AUTO: 80.2 % — HIGH (ref 43–77)
NRBC # BLD: 0 /100 WBCS — SIGNIFICANT CHANGE UP (ref 0–0)
PLATELET # BLD AUTO: 210 K/UL — SIGNIFICANT CHANGE UP (ref 150–400)
POTASSIUM SERPL-MCNC: 5.2 MMOL/L — SIGNIFICANT CHANGE UP (ref 3.5–5.3)
POTASSIUM SERPL-SCNC: 5.2 MMOL/L — SIGNIFICANT CHANGE UP (ref 3.5–5.3)
PROTHROM AB SERPL-ACNC: 13.1 SEC — SIGNIFICANT CHANGE UP (ref 10.6–13.6)
RBC # BLD: 5.1 M/UL — SIGNIFICANT CHANGE UP (ref 4.2–5.8)
RBC # FLD: 13.8 % — SIGNIFICANT CHANGE UP (ref 10.3–14.5)
RH IG SCN BLD-IMP: POSITIVE — SIGNIFICANT CHANGE UP
SODIUM SERPL-SCNC: 138 MMOL/L — SIGNIFICANT CHANGE UP (ref 135–145)
WBC # BLD: 7.61 K/UL — SIGNIFICANT CHANGE UP (ref 3.8–10.5)
WBC # FLD AUTO: 7.61 K/UL — SIGNIFICANT CHANGE UP (ref 3.8–10.5)

## 2021-11-10 PROCEDURE — 88331 PATH CONSLTJ SURG 1 BLK 1SPC: CPT | Mod: 26

## 2021-11-10 PROCEDURE — 32663 THORACOSCOPY W/LOBECTOMY: CPT | Mod: 80

## 2021-11-10 PROCEDURE — 32674 THORACOSCOPY LYMPH NODE EXC: CPT | Mod: 80

## 2021-11-10 PROCEDURE — 92242 FLUORESCEIN&ICG ANGIOGRAPHY: CPT | Mod: 26,80

## 2021-11-10 PROCEDURE — S2900 ROBOTIC SURGICAL SYSTEM: CPT | Mod: NC

## 2021-11-10 PROCEDURE — 71045 X-RAY EXAM CHEST 1 VIEW: CPT | Mod: 26

## 2021-11-10 PROCEDURE — 32674 THORACOSCOPY LYMPH NODE EXC: CPT

## 2021-11-10 PROCEDURE — 88309 TISSUE EXAM BY PATHOLOGIST: CPT | Mod: 26

## 2021-11-10 PROCEDURE — 88305 TISSUE EXAM BY PATHOLOGIST: CPT | Mod: 26

## 2021-11-10 PROCEDURE — 32663 THORACOSCOPY W/LOBECTOMY: CPT

## 2021-11-10 PROCEDURE — 31622 DX BRONCHOSCOPE/WASH: CPT

## 2021-11-10 PROCEDURE — 88332 PATH CONSLTJ SURG EA ADD BLK: CPT | Mod: 26

## 2021-11-10 PROCEDURE — 92242 FLUORESCEIN&ICG ANGIOGRAPHY: CPT | Mod: 26

## 2021-11-10 RX ORDER — BUPIVACAINE 13.3 MG/ML
20 INJECTION, SUSPENSION, LIPOSOMAL INFILTRATION ONCE
Refills: 0 | Status: DISCONTINUED | OUTPATIENT
Start: 2021-11-10 | End: 2021-11-14

## 2021-11-10 RX ORDER — INSULIN LISPRO 100/ML
VIAL (ML) SUBCUTANEOUS
Refills: 0 | Status: DISCONTINUED | OUTPATIENT
Start: 2021-11-10 | End: 2021-11-13

## 2021-11-10 RX ORDER — SODIUM CHLORIDE 9 MG/ML
1000 INJECTION, SOLUTION INTRAVENOUS
Refills: 0 | Status: DISCONTINUED | OUTPATIENT
Start: 2021-11-10 | End: 2021-11-14

## 2021-11-10 RX ORDER — DEXTROSE 50 % IN WATER 50 %
25 SYRINGE (ML) INTRAVENOUS ONCE
Refills: 0 | Status: DISCONTINUED | OUTPATIENT
Start: 2021-11-10 | End: 2021-11-13

## 2021-11-10 RX ORDER — MORPHINE SULFATE 50 MG/1
2 CAPSULE, EXTENDED RELEASE ORAL ONCE
Refills: 0 | Status: DISCONTINUED | OUTPATIENT
Start: 2021-11-10 | End: 2021-11-10

## 2021-11-10 RX ORDER — HEPARIN SODIUM 5000 [USP'U]/ML
5000 INJECTION INTRAVENOUS; SUBCUTANEOUS EVERY 8 HOURS
Refills: 0 | Status: DISCONTINUED | OUTPATIENT
Start: 2021-11-10 | End: 2021-11-14

## 2021-11-10 RX ORDER — CEFAZOLIN SODIUM 1 G
2000 VIAL (EA) INJECTION EVERY 8 HOURS
Refills: 0 | Status: COMPLETED | OUTPATIENT
Start: 2021-11-10 | End: 2021-11-11

## 2021-11-10 RX ORDER — SODIUM CHLORIDE 9 MG/ML
1000 INJECTION, SOLUTION INTRAVENOUS
Refills: 0 | Status: DISCONTINUED | OUTPATIENT
Start: 2021-11-10 | End: 2021-11-13

## 2021-11-10 RX ORDER — PANTOPRAZOLE SODIUM 20 MG/1
40 TABLET, DELAYED RELEASE ORAL
Refills: 0 | Status: DISCONTINUED | OUTPATIENT
Start: 2021-11-10 | End: 2021-11-14

## 2021-11-10 RX ORDER — ACETAMINOPHEN 500 MG
650 TABLET ORAL EVERY 6 HOURS
Refills: 0 | Status: DISCONTINUED | OUTPATIENT
Start: 2021-11-10 | End: 2021-11-11

## 2021-11-10 RX ORDER — POLYETHYLENE GLYCOL 3350 17 G/17G
17 POWDER, FOR SOLUTION ORAL DAILY
Refills: 0 | Status: DISCONTINUED | OUTPATIENT
Start: 2021-11-11 | End: 2021-11-14

## 2021-11-10 RX ORDER — INFLUENZA VIRUS VACCINE 15; 15; 15; 15 UG/.5ML; UG/.5ML; UG/.5ML; UG/.5ML
0.5 SUSPENSION INTRAMUSCULAR ONCE
Refills: 0 | Status: DISCONTINUED | OUTPATIENT
Start: 2021-11-10 | End: 2021-11-14

## 2021-11-10 RX ORDER — DEXTROSE 50 % IN WATER 50 %
15 SYRINGE (ML) INTRAVENOUS ONCE
Refills: 0 | Status: DISCONTINUED | OUTPATIENT
Start: 2021-11-10 | End: 2021-11-13

## 2021-11-10 RX ORDER — FAMOTIDINE 10 MG/ML
20 INJECTION INTRAVENOUS DAILY
Refills: 0 | Status: DISCONTINUED | OUTPATIENT
Start: 2021-11-10 | End: 2021-11-10

## 2021-11-10 RX ORDER — ACETAMINOPHEN 500 MG
1000 TABLET ORAL ONCE
Refills: 0 | Status: COMPLETED | OUTPATIENT
Start: 2021-11-10 | End: 2021-11-10

## 2021-11-10 RX ORDER — LIDOCAINE 4 G/100G
1 CREAM TOPICAL DAILY
Refills: 0 | Status: DISCONTINUED | OUTPATIENT
Start: 2021-11-10 | End: 2021-11-14

## 2021-11-10 RX ORDER — DEXTROSE 50 % IN WATER 50 %
12.5 SYRINGE (ML) INTRAVENOUS ONCE
Refills: 0 | Status: DISCONTINUED | OUTPATIENT
Start: 2021-11-10 | End: 2021-11-13

## 2021-11-10 RX ORDER — GLUCAGON INJECTION, SOLUTION 0.5 MG/.1ML
1 INJECTION, SOLUTION SUBCUTANEOUS ONCE
Refills: 0 | Status: DISCONTINUED | OUTPATIENT
Start: 2021-11-10 | End: 2021-11-14

## 2021-11-10 RX ADMIN — Medication 650 MILLIGRAM(S): at 21:48

## 2021-11-10 RX ADMIN — SODIUM CHLORIDE 50 MILLILITER(S): 9 INJECTION, SOLUTION INTRAVENOUS at 20:03

## 2021-11-10 RX ADMIN — LIDOCAINE 1 PATCH: 4 CREAM TOPICAL at 13:53

## 2021-11-10 RX ADMIN — Medication 650 MILLIGRAM(S): at 21:22

## 2021-11-10 RX ADMIN — Medication 1000 MILLIGRAM(S): at 14:30

## 2021-11-10 RX ADMIN — HEPARIN SODIUM 5000 UNIT(S): 5000 INJECTION INTRAVENOUS; SUBCUTANEOUS at 22:17

## 2021-11-10 RX ADMIN — Medication 400 MILLIGRAM(S): at 14:00

## 2021-11-10 RX ADMIN — MORPHINE SULFATE 2 MILLIGRAM(S): 50 CAPSULE, EXTENDED RELEASE ORAL at 21:47

## 2021-11-10 RX ADMIN — Medication 2: at 22:18

## 2021-11-10 RX ADMIN — MORPHINE SULFATE 2 MILLIGRAM(S): 50 CAPSULE, EXTENDED RELEASE ORAL at 23:57

## 2021-11-10 RX ADMIN — Medication 100 MILLIGRAM(S): at 20:00

## 2021-11-10 NOTE — PROGRESS NOTE ADULT - SUBJECTIVE AND OBJECTIVE BOX
Post op acceptance    Operation / Date:  11/10/21 R VATS RULx    SUBJECTIVE ASSESSMENT:  61y Male         Vital Signs Last 24 Hrs  T(C): --  T(F): --  HR: --  BP: --  BP(mean): --  RR: --  SpO2: --  I&O's Detail      CHEST TUBE:  Yes x 1 on suction, no leak.   TIE DOWNS:yes  RODRIGUEZ: No.    PHYSICAL EXAM:    General:     Neurological:    Cardiovascular:    Respiratory:    Gastrointestinal:    Extremities:    Vascular:    Incision Sites:    LABS:                        12.7   7.61  )-----------( 210      ( 10 Nov 2021 12:53 )             40.1       COUMADIN:  NO    PTT - ( 10 Nov 2021 07:05 )  PTT:37.9 sec                MEDICATIONS  (STANDING):  acetaminophen   IVPB .. 1000 milliGRAM(s) IV Intermittent once  BUpivacaine liposome 1.3% Injectable (no eMAR) 20 milliLiter(s) Local Injection once  ceFAZolin   IVPB 2000 milliGRAM(s) IV Intermittent every 8 hours  dextrose 40% Gel 15 Gram(s) Oral once  dextrose 5%. 1000 milliLiter(s) (50 mL/Hr) IV Continuous <Continuous>  dextrose 5%. 1000 milliLiter(s) (100 mL/Hr) IV Continuous <Continuous>  dextrose 50% Injectable 25 Gram(s) IV Push once  dextrose 50% Injectable 12.5 Gram(s) IV Push once  dextrose 50% Injectable 25 Gram(s) IV Push once  famotidine Injectable 20 milliGRAM(s) IV Push daily  glucagon  Injectable 1 milliGRAM(s) IntraMuscular once  heparin   Injectable 5000 Unit(s) SubCutaneous every 8 hours  influenza   Vaccine 0.5 milliLiter(s) IntraMuscular once  insulin lispro (ADMELOG) corrective regimen sliding scale   SubCutaneous three times a day before meals  lactated ringers. 1000 milliLiter(s) (50 mL/Hr) IV Continuous <Continuous>  lidocaine   4% Patch 1 Patch Transdermal daily    MEDICATIONS  (PRN):        RADIOLOGY & ADDITIONAL TESTS:     Post op acceptance    Operation / Date:  11/10/21 R VATS RULx    SUBJECTIVE ASSESSMENT:  61y Male seen and examined. Feels well, offers no acute complaints. Denies chest pain, SOB, abdominal pain, dizziness.         Vital Signs Last 24 Hrs  T(C): --  T(F): --  HR: --  BP: --  BP(mean): --  RR: --  SpO2: --  I&O's Detail      CHEST TUBE:  Yes x 1 on suction, intermittent air leak .  TIE DOWNS:yes  RODRIGUEZ: No.    PHYSICAL EXAM:    General: Patient lying comfortably in bed, no acute distress     Neurological: Alert and oriented. No focal neurological deficits     Cardiovascular: S1S2, RRR, no murmurs appreciated on exam     Respiratory: Clear to ausculation bilaterally, no wheeze/rhonchi/rales    Gastrointestinal: + BS, soft, non tender, non distended     Extremities: Warm and well perfused. No edema, no calf tenderness     Vascular: 2+ Peripheral pulses b/l     Incision Sites: VATS/CT site: CDI    LABS:                        12.7   7.61  )-----------( 210      ( 10 Nov 2021 12:53 )             40.1       COUMADIN:  NO    PTT - ( 10 Nov 2021 07:05 )  PTT:37.9 sec                MEDICATIONS  (STANDING):  acetaminophen   IVPB .. 1000 milliGRAM(s) IV Intermittent once  BUpivacaine liposome 1.3% Injectable (no eMAR) 20 milliLiter(s) Local Injection once  ceFAZolin   IVPB 2000 milliGRAM(s) IV Intermittent every 8 hours  dextrose 40% Gel 15 Gram(s) Oral once  dextrose 5%. 1000 milliLiter(s) (50 mL/Hr) IV Continuous <Continuous>  dextrose 5%. 1000 milliLiter(s) (100 mL/Hr) IV Continuous <Continuous>  dextrose 50% Injectable 25 Gram(s) IV Push once  dextrose 50% Injectable 12.5 Gram(s) IV Push once  dextrose 50% Injectable 25 Gram(s) IV Push once  famotidine Injectable 20 milliGRAM(s) IV Push daily  glucagon  Injectable 1 milliGRAM(s) IntraMuscular once  heparin   Injectable 5000 Unit(s) SubCutaneous every 8 hours  influenza   Vaccine 0.5 milliLiter(s) IntraMuscular once  insulin lispro (ADMELOG) corrective regimen sliding scale   SubCutaneous three times a day before meals  lactated ringers. 1000 milliLiter(s) (50 mL/Hr) IV Continuous <Continuous>  lidocaine   4% Patch 1 Patch Transdermal daily    MEDICATIONS  (PRN):        RADIOLOGY & ADDITIONAL TESTS:

## 2021-11-10 NOTE — PROGRESS NOTE ADULT - ASSESSMENT
61 year old M, current smoker, PMHx of COPD, HTN, GERD, HLD, with recent CT chest revealing spiculated right upper lobe lung nodule. He is referred by Dr. Bradley. CT chest completed on 7/26/21:spiculated predominantly groundglass 1.5 cm nodule in the right upper lobe. PET CT completed on 09/14/21 0.8cm groundglass nodule in the right upper lobe which does not demonstrate significant FDG uptake, likely below PET resolution. CT guided biopsy of the RUL nodule completed on 10/15/21 Adenocarcinoma; immunohistochemistry positive for TTF1 and Napsin, confirming lung primary. Today patient underwent R VATS RA RULx. Seen in PACU, 1 CT on suction no leak.     A/P:  Neurovascular: No delirium. Pain well controlled with current regimen.  -PRN's.  -IV tylenol 14:00, lidoderm    Cardiovascular: Hemodynamically stable. HR controlled.  -Hx HTN, HLD  -monitor hr/bp/tele    Respiratory: 02 Sat = 98% on 2lnc. s/p procedure above   -If on oxygen wean to RA from for O2 Sat > 93%.  -1 CT on suction, no leak, continue to suction   -Encourage ambulation, C+DB and Use of IS 10x / hr while awake.  -CXR- CT in place, no obvious ptx       GI: Stable.  -Protonix for GI PPX.  -Continue bowel regimen  -ADAT    Renal / : BUN/Cr  -Monitor renal function.  -Monitor I/O's.  -Replete lytes PRN    Endocrine:    -No Hx    Hematologic: H&H 12/40  -f/u AM CBC    ID: Afebrile, WBC 7  -complete periop abx  -Observe for SIRS/Sepsis Syndrome.    Prophylaxis:  -DVT prophylaxis with 5000 SubQ Heparin q8h.  -SCD's    Disposition:  -to 9la     61 year old M, current smoker, PMHx of COPD, HTN, GERD, HLD, with recent CT chest revealing spiculated right upper lobe lung nodule. He is referred by Dr. Bradley. CT chest completed on 7/26/21:spiculated predominantly groundglass 1.5 cm nodule in the right upper lobe. PET CT completed on 09/14/21 0.8cm groundglass nodule in the right upper lobe which does not demonstrate significant FDG uptake, likely below PET resolution. CT guided biopsy of the RUL nodule completed on 10/15/21 Adenocarcinoma; immunohistochemistry positive for TTF1 and Napsin, confirming lung primary. Today patient underwent R VATS RA RULx. Seen in PACU, 1 CT on suction no leak.     A/P:  Neurovascular: No delirium. Pain well controlled with current regimen.  -PRN's.  -IV tylenol 14:00, lidoderm    Cardiovascular: Hemodynamically stable.   -Hx HTN, HLD- no home meds, normotensive  -monitor hr/bp/tele    Respiratory: 02 Sat = 98% on 2lnc. s/p procedure above   -If on oxygen wean to RA from for O2 Sat > 93%.  -1 CT on suction, intermittent leak, continue to suction   -Encourage ambulation, C+DB and Use of IS 10x / hr while awake.  -CXR- CT in place, no obvious ptx       GI: Stable.  -Protonix for GI PPX.  -Continue bowel regimen  -ADAT    Renal / : BUN/Cr 16/0.9  -Monitor renal function.  -Monitor I/O's.  -Replete lytes PRN    Endocrine:    -No Hx    Hematologic: H&H 12/40  -f/u AM CBC    ID: Afebrile, WBC 7  -complete periop abx  -Observe for SIRS/Sepsis Syndrome.    Prophylaxis:  -DVT prophylaxis with 5000 SubQ Heparin q8h.  -SCD's    Disposition:  -to 9la

## 2021-11-11 LAB
A1C WITH ESTIMATED AVERAGE GLUCOSE RESULT: 5.8 % — HIGH (ref 4–5.6)
ANION GAP SERPL CALC-SCNC: 10 MMOL/L — SIGNIFICANT CHANGE UP (ref 5–17)
BASOPHILS # BLD AUTO: 0.01 K/UL — SIGNIFICANT CHANGE UP (ref 0–0.2)
BASOPHILS NFR BLD AUTO: 0.1 % — SIGNIFICANT CHANGE UP (ref 0–2)
BUN SERPL-MCNC: 15 MG/DL — SIGNIFICANT CHANGE UP (ref 7–23)
CALCIUM SERPL-MCNC: 9.1 MG/DL — SIGNIFICANT CHANGE UP (ref 8.4–10.5)
CHLORIDE SERPL-SCNC: 102 MMOL/L — SIGNIFICANT CHANGE UP (ref 96–108)
CO2 SERPL-SCNC: 25 MMOL/L — SIGNIFICANT CHANGE UP (ref 22–31)
COVID-19 NUCLEOCAPSID GAM AB INTERP: NEGATIVE — SIGNIFICANT CHANGE UP
COVID-19 NUCLEOCAPSID TOTAL GAM ANTIBODY RESULT: 0.08 INDEX — SIGNIFICANT CHANGE UP
COVID-19 SPIKE DOMAIN AB INTERP: POSITIVE
COVID-19 SPIKE DOMAIN ANTIBODY RESULT: 105 U/ML — HIGH
CREAT SERPL-MCNC: 0.7 MG/DL — SIGNIFICANT CHANGE UP (ref 0.5–1.3)
EOSINOPHIL # BLD AUTO: 0.01 K/UL — SIGNIFICANT CHANGE UP (ref 0–0.5)
EOSINOPHIL NFR BLD AUTO: 0.1 % — SIGNIFICANT CHANGE UP (ref 0–6)
ESTIMATED AVERAGE GLUCOSE: 120 MG/DL — HIGH (ref 68–114)
GLUCOSE BLDC GLUCOMTR-MCNC: 106 MG/DL — HIGH (ref 70–99)
GLUCOSE BLDC GLUCOMTR-MCNC: 108 MG/DL — HIGH (ref 70–99)
GLUCOSE BLDC GLUCOMTR-MCNC: 115 MG/DL — HIGH (ref 70–99)
GLUCOSE BLDC GLUCOMTR-MCNC: 139 MG/DL — HIGH (ref 70–99)
GLUCOSE SERPL-MCNC: 163 MG/DL — HIGH (ref 70–99)
HCT VFR BLD CALC: 37.6 % — LOW (ref 39–50)
HGB BLD-MCNC: 12.1 G/DL — LOW (ref 13–17)
IMM GRANULOCYTES NFR BLD AUTO: 0.2 % — SIGNIFICANT CHANGE UP (ref 0–1.5)
LYMPHOCYTES # BLD AUTO: 4.24 K/UL — HIGH (ref 1–3.3)
LYMPHOCYTES # BLD AUTO: 42.9 % — SIGNIFICANT CHANGE UP (ref 13–44)
MCHC RBC-ENTMCNC: 24.7 PG — LOW (ref 27–34)
MCHC RBC-ENTMCNC: 32.2 GM/DL — SIGNIFICANT CHANGE UP (ref 32–36)
MCV RBC AUTO: 76.7 FL — LOW (ref 80–100)
MONOCYTES # BLD AUTO: 0.89 K/UL — SIGNIFICANT CHANGE UP (ref 0–0.9)
MONOCYTES NFR BLD AUTO: 9 % — SIGNIFICANT CHANGE UP (ref 2–14)
NEUTROPHILS # BLD AUTO: 4.72 K/UL — SIGNIFICANT CHANGE UP (ref 1.8–7.4)
NEUTROPHILS NFR BLD AUTO: 47.7 % — SIGNIFICANT CHANGE UP (ref 43–77)
NRBC # BLD: 0 /100 WBCS — SIGNIFICANT CHANGE UP (ref 0–0)
PLATELET # BLD AUTO: 211 K/UL — SIGNIFICANT CHANGE UP (ref 150–400)
POTASSIUM SERPL-MCNC: 4.1 MMOL/L — SIGNIFICANT CHANGE UP (ref 3.5–5.3)
POTASSIUM SERPL-SCNC: 4.1 MMOL/L — SIGNIFICANT CHANGE UP (ref 3.5–5.3)
RBC # BLD: 4.9 M/UL — SIGNIFICANT CHANGE UP (ref 4.2–5.8)
RBC # FLD: 13.7 % — SIGNIFICANT CHANGE UP (ref 10.3–14.5)
SARS-COV-2 IGG+IGM SERPL QL IA: 0.08 INDEX — SIGNIFICANT CHANGE UP
SARS-COV-2 IGG+IGM SERPL QL IA: 105 U/ML — HIGH
SARS-COV-2 IGG+IGM SERPL QL IA: NEGATIVE — SIGNIFICANT CHANGE UP
SARS-COV-2 IGG+IGM SERPL QL IA: POSITIVE
SODIUM SERPL-SCNC: 137 MMOL/L — SIGNIFICANT CHANGE UP (ref 135–145)
WBC # BLD: 9.89 K/UL — SIGNIFICANT CHANGE UP (ref 3.8–10.5)
WBC # FLD AUTO: 9.89 K/UL — SIGNIFICANT CHANGE UP (ref 3.8–10.5)

## 2021-11-11 PROCEDURE — 71045 X-RAY EXAM CHEST 1 VIEW: CPT | Mod: 26

## 2021-11-11 RX ORDER — ONDANSETRON 8 MG/1
4 TABLET, FILM COATED ORAL EVERY 6 HOURS
Refills: 0 | Status: DISCONTINUED | OUTPATIENT
Start: 2021-11-11 | End: 2021-11-14

## 2021-11-11 RX ORDER — HYDROMORPHONE HYDROCHLORIDE 2 MG/ML
30 INJECTION INTRAMUSCULAR; INTRAVENOUS; SUBCUTANEOUS
Refills: 0 | Status: DISCONTINUED | OUTPATIENT
Start: 2021-11-11 | End: 2021-11-12

## 2021-11-11 RX ORDER — NALOXONE HYDROCHLORIDE 4 MG/.1ML
0.1 SPRAY NASAL
Refills: 0 | Status: DISCONTINUED | OUTPATIENT
Start: 2021-11-11 | End: 2021-11-14

## 2021-11-11 RX ORDER — KETOROLAC TROMETHAMINE 30 MG/ML
15 SYRINGE (ML) INJECTION EVERY 6 HOURS
Refills: 0 | Status: DISCONTINUED | OUTPATIENT
Start: 2021-11-11 | End: 2021-11-12

## 2021-11-11 RX ORDER — HYDROMORPHONE HYDROCHLORIDE 2 MG/ML
0.25 INJECTION INTRAMUSCULAR; INTRAVENOUS; SUBCUTANEOUS ONCE
Refills: 0 | Status: DISCONTINUED | OUTPATIENT
Start: 2021-11-11 | End: 2021-11-11

## 2021-11-11 RX ORDER — ACETAMINOPHEN 500 MG
975 TABLET ORAL EVERY 6 HOURS
Refills: 0 | Status: DISCONTINUED | OUTPATIENT
Start: 2021-11-11 | End: 2021-11-14

## 2021-11-11 RX ORDER — ACETAMINOPHEN 500 MG
1000 TABLET ORAL ONCE
Refills: 0 | Status: COMPLETED | OUTPATIENT
Start: 2021-11-11 | End: 2021-11-11

## 2021-11-11 RX ORDER — FENTANYL CITRATE 50 UG/ML
25 INJECTION INTRAVENOUS ONCE
Refills: 0 | Status: DISCONTINUED | OUTPATIENT
Start: 2021-11-11 | End: 2021-11-11

## 2021-11-11 RX ADMIN — Medication 975 MILLIGRAM(S): at 18:06

## 2021-11-11 RX ADMIN — HEPARIN SODIUM 5000 UNIT(S): 5000 INJECTION INTRAVENOUS; SUBCUTANEOUS at 21:01

## 2021-11-11 RX ADMIN — Medication 15 MILLIGRAM(S): at 23:35

## 2021-11-11 RX ADMIN — Medication 975 MILLIGRAM(S): at 17:09

## 2021-11-11 RX ADMIN — Medication 15 MILLIGRAM(S): at 12:00

## 2021-11-11 RX ADMIN — Medication 15 MILLIGRAM(S): at 11:24

## 2021-11-11 RX ADMIN — Medication 975 MILLIGRAM(S): at 12:08

## 2021-11-11 RX ADMIN — HYDROMORPHONE HYDROCHLORIDE 30 MILLILITER(S): 2 INJECTION INTRAMUSCULAR; INTRAVENOUS; SUBCUTANEOUS at 07:45

## 2021-11-11 RX ADMIN — FENTANYL CITRATE 25 MICROGRAM(S): 50 INJECTION INTRAVENOUS at 07:05

## 2021-11-11 RX ADMIN — HYDROMORPHONE HYDROCHLORIDE 0.25 MILLIGRAM(S): 2 INJECTION INTRAMUSCULAR; INTRAVENOUS; SUBCUTANEOUS at 08:44

## 2021-11-11 RX ADMIN — LIDOCAINE 1 PATCH: 4 CREAM TOPICAL at 01:53

## 2021-11-11 RX ADMIN — Medication 100 MILLIGRAM(S): at 04:15

## 2021-11-11 RX ADMIN — LIDOCAINE 1 PATCH: 4 CREAM TOPICAL at 11:30

## 2021-11-11 RX ADMIN — HEPARIN SODIUM 5000 UNIT(S): 5000 INJECTION INTRAVENOUS; SUBCUTANEOUS at 06:02

## 2021-11-11 RX ADMIN — SODIUM CHLORIDE 50 MILLILITER(S): 9 INJECTION, SOLUTION INTRAVENOUS at 19:07

## 2021-11-11 RX ADMIN — Medication 1000 MILLIGRAM(S): at 06:52

## 2021-11-11 RX ADMIN — HYDROMORPHONE HYDROCHLORIDE 0.25 MILLIGRAM(S): 2 INJECTION INTRAMUSCULAR; INTRAVENOUS; SUBCUTANEOUS at 09:15

## 2021-11-11 RX ADMIN — Medication 400 MILLIGRAM(S): at 06:01

## 2021-11-11 RX ADMIN — Medication 15 MILLIGRAM(S): at 19:13

## 2021-11-11 RX ADMIN — Medication 100 MILLIGRAM(S): at 11:25

## 2021-11-11 RX ADMIN — FENTANYL CITRATE 25 MICROGRAM(S): 50 INJECTION INTRAVENOUS at 06:23

## 2021-11-11 RX ADMIN — PANTOPRAZOLE SODIUM 40 MILLIGRAM(S): 20 TABLET, DELAYED RELEASE ORAL at 06:02

## 2021-11-11 RX ADMIN — POLYETHYLENE GLYCOL 3350 17 GRAM(S): 17 POWDER, FOR SOLUTION ORAL at 14:00

## 2021-11-11 RX ADMIN — Medication 975 MILLIGRAM(S): at 11:25

## 2021-11-11 RX ADMIN — HEPARIN SODIUM 5000 UNIT(S): 5000 INJECTION INTRAVENOUS; SUBCUTANEOUS at 14:16

## 2021-11-11 RX ADMIN — LIDOCAINE 1 PATCH: 4 CREAM TOPICAL at 19:09

## 2021-11-11 RX ADMIN — Medication 15 MILLIGRAM(S): at 20:13

## 2021-11-11 NOTE — PROGRESS NOTE ADULT - SUBJECTIVE AND OBJECTIVE BOX
Patient discussed on morning rounds with Dr. Luong    Operation / Date: 11/10/21 Snow JAMES RA    SUBJECTIVE ASSESSMENT:  61y Male seen and examined         Vital Signs Last 24 Hrs  T(C): 36.9 (11 Nov 2021 08:28), Max: 36.9 (11 Nov 2021 08:28)  T(F): 98.5 (11 Nov 2021 08:28), Max: 98.5 (11 Nov 2021 08:28)  HR: 85 (11 Nov 2021 12:00) (78 - 96)  BP: 139/75 (11 Nov 2021 12:00) (132/69 - 156/79)  BP(mean): 101 (11 Nov 2021 12:00) (89 - 113)  RR: 20 (11 Nov 2021 12:00) (14 - 26)  SpO2: 91% (11 Nov 2021 12:00) (90% - 98%)  I&O's Detail    10 Nov 2021 07:01  -  11 Nov 2021 07:00  --------------------------------------------------------  IN:    IV PiggyBack: 250 mL    Lactated Ringers: 950 mL    Oral Fluid: 580 mL  Total IN: 1780 mL    OUT:    Chest Tube (mL): 380 mL    Voided (mL): 1050 mL  Total OUT: 1430 mL    Total NET: 350 mL      11 Nov 2021 07:01  -  11 Nov 2021 13:37  --------------------------------------------------------  IN:    Lactated Ringers: 100 mL  Total IN: 100 mL    OUT:    Chest Tube (mL): 20 mL    Voided (mL): 500 mL  Total OUT: 520 mL    Total NET: -420 mL          CHEST TUBE:  Yes/No. AIR LEAKS: Yes/No. Suction / H2O SEAL.   LES DRAIN:  Yes/No.  EPICARDIAL WIRES: Yes/No.  TIE DOWNS: Yes/No.  RODRIGUEZ: Yes/No.    PHYSICAL EXAM:    General:     Neurological:    Cardiovascular:    Respiratory:    Gastrointestinal:    Extremities:    Vascular:    Incision Sites:    LABS:                        12.1   9.89  )-----------( 211      ( 11 Nov 2021 06:41 )             37.6       COUMADIN:  Yes/No. REASON: .    PT/INR - ( 10 Nov 2021 12:53 )   PT: 13.1 sec;   INR: 1.10          PTT - ( 10 Nov 2021 12:53 )  PTT:34.5 sec    11-11    137  |  102  |  15  ----------------------------<  163<H>  4.1   |  25  |  0.70    Ca    9.1      11 Nov 2021 11:12            MEDICATIONS  (STANDING):  acetaminophen     Tablet .. 975 milliGRAM(s) Oral every 6 hours  BUpivacaine liposome 1.3% Injectable (no eMAR) 20 milliLiter(s) Local Injection once  dextrose 40% Gel 15 Gram(s) Oral once  dextrose 5%. 1000 milliLiter(s) (50 mL/Hr) IV Continuous <Continuous>  dextrose 5%. 1000 milliLiter(s) (100 mL/Hr) IV Continuous <Continuous>  dextrose 50% Injectable 25 Gram(s) IV Push once  dextrose 50% Injectable 12.5 Gram(s) IV Push once  dextrose 50% Injectable 25 Gram(s) IV Push once  glucagon  Injectable 1 milliGRAM(s) IntraMuscular once  heparin   Injectable 5000 Unit(s) SubCutaneous every 8 hours  HYDROmorphone PCA (1 mG/mL) 30 milliLiter(s) PCA Continuous PCA Continuous  influenza   Vaccine 0.5 milliLiter(s) IntraMuscular once  insulin lispro (ADMELOG) corrective regimen sliding scale   SubCutaneous Before meals and at bedtime  ketorolac   Injectable 15 milliGRAM(s) IV Push every 6 hours  lactated ringers. 1000 milliLiter(s) (50 mL/Hr) IV Continuous <Continuous>  lidocaine   4% Patch 1 Patch Transdermal daily  pantoprazole    Tablet 40 milliGRAM(s) Oral before breakfast  polyethylene glycol 3350 17 Gram(s) Oral daily    MEDICATIONS  (PRN):  naloxone Injectable 0.1 milliGRAM(s) IV Push every 3 minutes PRN For ANY of the following changes in patient status:  A. RR LESS THAN 10 breaths per minute, B. Oxygen saturation LESS THAN 90%, C. Sedation score of 6  ondansetron Injectable 4 milliGRAM(s) IV Push every 6 hours PRN Nausea        RADIOLOGY & ADDITIONAL TESTS:     Patient discussed on morning rounds with Dr. Luong    Operation / Date: 11/10/21 Snow JAMES RA    SUBJECTIVE ASSESSMENT:  61y Male seen and examined. Pain better controlled now that PCA has started.         Vital Signs Last 24 Hrs  T(C): 36.9 (11 Nov 2021 08:28), Max: 36.9 (11 Nov 2021 08:28)  T(F): 98.5 (11 Nov 2021 08:28), Max: 98.5 (11 Nov 2021 08:28)  HR: 85 (11 Nov 2021 12:00) (78 - 96)  BP: 139/75 (11 Nov 2021 12:00) (132/69 - 156/79)  BP(mean): 101 (11 Nov 2021 12:00) (89 - 113)  RR: 20 (11 Nov 2021 12:00) (14 - 26)  SpO2: 91% (11 Nov 2021 12:00) (90% - 98%)  I&O's Detail    10 Nov 2021 07:01  -  11 Nov 2021 07:00  --------------------------------------------------------  IN:    IV PiggyBack: 250 mL    Lactated Ringers: 950 mL    Oral Fluid: 580 mL  Total IN: 1780 mL    OUT:    Chest Tube (mL): 380 mL    Voided (mL): 1050 mL  Total OUT: 1430 mL    Total NET: 350 mL      11 Nov 2021 07:01  -  11 Nov 2021 13:37  --------------------------------------------------------  IN:    Lactated Ringers: 100 mL  Total IN: 100 mL    OUT:    Chest Tube (mL): 20 mL    Voided (mL): 500 mL  Total OUT: 520 mL    Total NET: -420 mL          CHEST TUBE: Yes, no air leak  TIE DOWNS: Yes  RODRIGUEZ: No.    PHYSICAL EXAM:  Appearance: No acute distress.  Neurologic: AAOx3, no AMS or focal deficits.  Responds appropriately to verbal and physical stimuli; exhibits purposeful movement in all extremities.  Cardiovascular: RRR  Respiratory: No acute respiratory distress. CTAB  Gastrointestinal:  Soft, non-tender, non-distended, + BS.	  Extremities: warm, well perfused. No edema.  Incisions: VATS sites CDI      LABS:                        12.1   9.89  )-----------( 211      ( 11 Nov 2021 06:41 )             37.6       COUMADIN:  Yes/No. REASON: .    PT/INR - ( 10 Nov 2021 12:53 )   PT: 13.1 sec;   INR: 1.10          PTT - ( 10 Nov 2021 12:53 )  PTT:34.5 sec    11-11    137  |  102  |  15  ----------------------------<  163<H>  4.1   |  25  |  0.70    Ca    9.1      11 Nov 2021 11:12            MEDICATIONS  (STANDING):  acetaminophen     Tablet .. 975 milliGRAM(s) Oral every 6 hours  BUpivacaine liposome 1.3% Injectable (no eMAR) 20 milliLiter(s) Local Injection once  dextrose 40% Gel 15 Gram(s) Oral once  dextrose 5%. 1000 milliLiter(s) (50 mL/Hr) IV Continuous <Continuous>  dextrose 5%. 1000 milliLiter(s) (100 mL/Hr) IV Continuous <Continuous>  dextrose 50% Injectable 25 Gram(s) IV Push once  dextrose 50% Injectable 12.5 Gram(s) IV Push once  dextrose 50% Injectable 25 Gram(s) IV Push once  glucagon  Injectable 1 milliGRAM(s) IntraMuscular once  heparin   Injectable 5000 Unit(s) SubCutaneous every 8 hours  HYDROmorphone PCA (1 mG/mL) 30 milliLiter(s) PCA Continuous PCA Continuous  influenza   Vaccine 0.5 milliLiter(s) IntraMuscular once  insulin lispro (ADMELOG) corrective regimen sliding scale   SubCutaneous Before meals and at bedtime  ketorolac   Injectable 15 milliGRAM(s) IV Push every 6 hours  lactated ringers. 1000 milliLiter(s) (50 mL/Hr) IV Continuous <Continuous>  lidocaine   4% Patch 1 Patch Transdermal daily  pantoprazole    Tablet 40 milliGRAM(s) Oral before breakfast  polyethylene glycol 3350 17 Gram(s) Oral daily    MEDICATIONS  (PRN):  naloxone Injectable 0.1 milliGRAM(s) IV Push every 3 minutes PRN For ANY of the following changes in patient status:  A. RR LESS THAN 10 breaths per minute, B. Oxygen saturation LESS THAN 90%, C. Sedation score of 6  ondansetron Injectable 4 milliGRAM(s) IV Push every 6 hours PRN Nausea        RADIOLOGY & ADDITIONAL TESTS:

## 2021-11-11 NOTE — PACU DISCHARGE NOTE - COMMENTS
Verbal report given to RN Page, v/s stable, chest tube to suction, pt. voicing no complaints, pt. transferred to room 926

## 2021-11-11 NOTE — PROGRESS NOTE ADULT - ASSESSMENT
61 year old M, current smoker, PMHx of COPD, HTN, GERD, HLD, with recent CT chest revealing spiculated right upper lobe lung nodule. He is referred by Dr. Bradley. CT chest completed on 7/26/21:spiculated predominantly groundglass 1.5 cm nodule in the right upper lobe. PET CT completed on 09/14/21 0.8cm groundglass nodule in the right upper lobe which does not demonstrate significant FDG uptake, likely below PET resolution. CT guided biopsy of the RUL nodule completed on 10/15/21 Adenocarcinoma; immunohistochemistry positive for TTF1 and Napsin, confirming lung primary. Today patient underwent R VATS RA RULx. Seen in PACU, 1 CT on suction no leak. Severe pain overnight and splinting. Dilaudid PCA started this AM. Pain better controlled. Emphazing ISS, OBB, deep breathing.    Neurovascular:   - Dilaudid PCA  - Standing Tylenol and Toradol    Cardiovascular:  - Hemodynamically stable.   -Hx HTN, HLD- no home meds, normotensive  -monitor hr/bp/tele    Respiratory:   -02 Sat = 98% on 2lnc. s/p procedure above   -1 CT on suction, intermittent leak, continue to suction   -Encourage ambulation, C+DB and Use of IS 10x / hr while awake.  -CXR with atelectasis in RML, keep CT on suction, pain control and ISS      GI:  -Protonix for GI PPX.  -Continue bowel regimen  - Regular diet    Renal / :  - BUN/Cr stable  -Monitor renal function.  -Monitor I/O's.  -Replete lytes PRN      Hematologic:   H&H stable    ID:   -complete periop abx  - no issues  -Observe for SIRS/Sepsis Syndrome.    Prophylaxis:  -DVT prophylaxis with 5000 SubQ Heparin q8h.  -SCD's    Disposition:  -to 9la, home once pain controlled and CT removed.

## 2021-11-12 ENCOUNTER — TRANSCRIPTION ENCOUNTER (OUTPATIENT)
Age: 61
End: 2021-11-12

## 2021-11-12 PROBLEM — E53.8 DEFICIENCY OF OTHER SPECIFIED B GROUP VITAMINS: Chronic | Status: ACTIVE | Noted: 2021-11-09

## 2021-11-12 PROBLEM — Z87.09 PERSONAL HISTORY OF OTHER DISEASES OF THE RESPIRATORY SYSTEM: Chronic | Status: ACTIVE | Noted: 2021-11-09

## 2021-11-12 PROBLEM — E55.9 VITAMIN D DEFICIENCY, UNSPECIFIED: Chronic | Status: ACTIVE | Noted: 2021-11-09

## 2021-11-12 PROBLEM — Z87.898 PERSONAL HISTORY OF OTHER SPECIFIED CONDITIONS: Chronic | Status: ACTIVE | Noted: 2021-11-09

## 2021-11-12 PROBLEM — C34.90 MALIGNANT NEOPLASM OF UNSPECIFIED PART OF UNSPECIFIED BRONCHUS OR LUNG: Chronic | Status: ACTIVE | Noted: 2021-11-09

## 2021-11-12 PROBLEM — R01.1 CARDIAC MURMUR, UNSPECIFIED: Chronic | Status: ACTIVE | Noted: 2021-11-09

## 2021-11-12 PROBLEM — K25.9 GASTRIC ULCER, UNSPECIFIED AS ACUTE OR CHRONIC, WITHOUT HEMORRHAGE OR PERFORATION: Chronic | Status: ACTIVE | Noted: 2021-11-09

## 2021-11-12 PROBLEM — Z86.79 PERSONAL HISTORY OF OTHER DISEASES OF THE CIRCULATORY SYSTEM: Chronic | Status: ACTIVE | Noted: 2021-11-09

## 2021-11-12 PROBLEM — K21.9 GASTRO-ESOPHAGEAL REFLUX DISEASE WITHOUT ESOPHAGITIS: Chronic | Status: ACTIVE | Noted: 2021-11-09

## 2021-11-12 PROBLEM — E78.5 HYPERLIPIDEMIA, UNSPECIFIED: Chronic | Status: ACTIVE | Noted: 2021-11-09

## 2021-11-12 PROBLEM — K29.70 GASTRITIS, UNSPECIFIED, WITHOUT BLEEDING: Chronic | Status: ACTIVE | Noted: 2021-11-09

## 2021-11-12 LAB
GLUCOSE BLDC GLUCOMTR-MCNC: 121 MG/DL — HIGH (ref 70–99)
GLUCOSE BLDC GLUCOMTR-MCNC: 91 MG/DL — SIGNIFICANT CHANGE UP (ref 70–99)
GLUCOSE BLDC GLUCOMTR-MCNC: 93 MG/DL — SIGNIFICANT CHANGE UP (ref 70–99)
GLUCOSE BLDC GLUCOMTR-MCNC: 96 MG/DL — SIGNIFICANT CHANGE UP (ref 70–99)

## 2021-11-12 PROCEDURE — 71045 X-RAY EXAM CHEST 1 VIEW: CPT | Mod: 26

## 2021-11-12 RX ORDER — OXYCODONE HYDROCHLORIDE 5 MG/1
5 TABLET ORAL EVERY 6 HOURS
Refills: 0 | Status: DISCONTINUED | OUTPATIENT
Start: 2021-11-12 | End: 2021-11-14

## 2021-11-12 RX ORDER — DIPHENHYDRAMINE HCL 50 MG
25 CAPSULE ORAL ONCE
Refills: 0 | Status: COMPLETED | OUTPATIENT
Start: 2021-11-12 | End: 2021-11-12

## 2021-11-12 RX ADMIN — HEPARIN SODIUM 5000 UNIT(S): 5000 INJECTION INTRAVENOUS; SUBCUTANEOUS at 05:50

## 2021-11-12 RX ADMIN — Medication 975 MILLIGRAM(S): at 21:54

## 2021-11-12 RX ADMIN — Medication 15 MILLIGRAM(S): at 06:49

## 2021-11-12 RX ADMIN — LIDOCAINE 1 PATCH: 4 CREAM TOPICAL at 11:43

## 2021-11-12 RX ADMIN — LIDOCAINE 1 PATCH: 4 CREAM TOPICAL at 14:28

## 2021-11-12 RX ADMIN — Medication 15 MILLIGRAM(S): at 17:25

## 2021-11-12 RX ADMIN — Medication 15 MILLIGRAM(S): at 16:54

## 2021-11-12 RX ADMIN — HEPARIN SODIUM 5000 UNIT(S): 5000 INJECTION INTRAVENOUS; SUBCUTANEOUS at 14:47

## 2021-11-12 RX ADMIN — Medication 975 MILLIGRAM(S): at 05:49

## 2021-11-12 RX ADMIN — Medication 25 MILLIGRAM(S): at 21:54

## 2021-11-12 RX ADMIN — LIDOCAINE 1 PATCH: 4 CREAM TOPICAL at 00:35

## 2021-11-12 RX ADMIN — Medication 975 MILLIGRAM(S): at 12:49

## 2021-11-12 RX ADMIN — Medication 15 MILLIGRAM(S): at 12:49

## 2021-11-12 RX ADMIN — OXYCODONE HYDROCHLORIDE 5 MILLIGRAM(S): 5 TABLET ORAL at 21:55

## 2021-11-12 RX ADMIN — Medication 975 MILLIGRAM(S): at 11:43

## 2021-11-12 RX ADMIN — Medication 975 MILLIGRAM(S): at 06:49

## 2021-11-12 RX ADMIN — Medication 15 MILLIGRAM(S): at 11:43

## 2021-11-12 RX ADMIN — HYDROMORPHONE HYDROCHLORIDE 30 MILLILITER(S): 2 INJECTION INTRAMUSCULAR; INTRAVENOUS; SUBCUTANEOUS at 08:33

## 2021-11-12 RX ADMIN — Medication 15 MILLIGRAM(S): at 00:35

## 2021-11-12 RX ADMIN — Medication 975 MILLIGRAM(S): at 16:55

## 2021-11-12 RX ADMIN — Medication 975 MILLIGRAM(S): at 22:02

## 2021-11-12 RX ADMIN — POLYETHYLENE GLYCOL 3350 17 GRAM(S): 17 POWDER, FOR SOLUTION ORAL at 11:45

## 2021-11-12 RX ADMIN — Medication 975 MILLIGRAM(S): at 17:25

## 2021-11-12 RX ADMIN — Medication 15 MILLIGRAM(S): at 05:49

## 2021-11-12 RX ADMIN — PANTOPRAZOLE SODIUM 40 MILLIGRAM(S): 20 TABLET, DELAYED RELEASE ORAL at 06:00

## 2021-11-12 NOTE — DISCHARGE NOTE PROVIDER - NSDCFUADDINST_GEN_ALL_CORE_FT
-Walk daily as tolerated and use your incentive spirometer every hour.    -No driving or strenuous activity/exercise for 6 weeks, or until cleared by your surgeon.     -Gently clean your incisions with anti-bacterial soap and water, pat dry.  You may leave them open to air.    -Call your doctor if you have shortness of breath, chest pain not relieved by pain medication, dizziness, fever >101.5, or increased redness or drainage from incisions.

## 2021-11-12 NOTE — DISCHARGE NOTE PROVIDER - NSDCACTIVITY_GEN_ALL_CORE
Do not drive or operate machinery/Showering allowed/Do not make important decisions/No heavy lifting/straining Do not drive or operate machinery/Showering allowed/Do not make important decisions/Walking - Indoors allowed/No heavy lifting/straining/Walking - Outdoors allowed/Follow Instructions Provided by your Surgical Team

## 2021-11-12 NOTE — DISCHARGE NOTE PROVIDER - CARE PROVIDERS DIRECT ADDRESSES
[email or web address]

## 2021-11-12 NOTE — DISCHARGE NOTE PROVIDER - CARE PROVIDER_API CALL
Jay Jay Luong (MD)  Surgery; Thoracic Surgery  [address], [address]th Floor  [place], [place] [address]  Phone: [phone]  Fax: [phone]  Follow Up Time:    Jay Jay Luong)  Surgery; Thoracic Surgery  [address], [address]th [place] [address]  Phone: [phone]  Fax: [phone]  Scheduled Appointment: 11/18/2021 09:00 AM    Mateo Bradley)  Critical Care Medicine  [address], [address]nd [place] [address]  Phone: [phone]  Fax: [phone]  Scheduled Appointment: 11/29/2021 10:00 AM

## 2021-11-12 NOTE — DISCHARGE NOTE PROVIDER - NSDCCPCAREPLAN_GEN_ALL_CORE_FT
PRINCIPAL DISCHARGE DIAGNOSIS  Diagnosis: Right upper lobe pulmonary nodule  Assessment and Plan of Treatment:

## 2021-11-12 NOTE — DISCHARGE NOTE PROVIDER - HOSPITAL COURSE
61 year old M, current smoker, PMHx of COPD, HTN, GERD, HLD, with recent CT chest revealing spiculated right upper lobe lung nodule. He is referred by Dr. Bradley. CT chest completed on 7/26/21:spiculated predominantly groundglass 1.5 cm nodule in the right upper lobe. PET CT completed on 09/14/21 0.8cm groundglass nodule in the right upper lobe which does not demonstrate significant FDG uptake, likely below PET resolution. CT guided biopsy of the RUL nodule completed on 10/15/21 Adenocarcinoma; immunohistochemistry positive for TTF1 and Napsin, confirming lung primary. On 11/10/21. patient underwent R VATS RA RULx. On POD 0, patient with severe pain overnight and splinting.  On POD 1, PCA with good pain management, patient ambulated the hallways, and good IS.  On POD 2, CT removed with not PTX post pull.............. 61 year old M, current smoker, PMHx of COPD, HTN, GERD, HLD, with recent CT chest revealing spiculated right upper lobe lung nodule. He is referred by Dr. Bradley. CT chest completed on 7/26/21:spiculated predominantly groundglass 1.5 cm nodule in the right upper lobe. PET CT completed on 09/14/21 0.8cm groundglass nodule in the right upper lobe which does not demonstrate significant FDG uptake, likely below PET resolution. CT guided biopsy of the RUL nodule completed on 10/15/21 Adenocarcinoma; immunohistochemistry positive for TTF1 and Napsin, confirming lung primary. On 11/10/21. patient underwent R VATS RA RULx. On POD 0, patient with severe pain overnight and splinting.  On POD 1, PCA with good pain management, patient ambulated the hallways, and good IS.  On POD 2, CT removed with no PTX post pull. POD patient with fever 61 year old M, current smoker, PMHx of COPD, HTN, GERD, HLD, with recent CT chest revealing spiculated right upper lobe lung nodule. He is referred by Dr. Bradley. CT chest completed on 7/26/21:spiculated predominantly groundglass 1.5 cm nodule in the right upper lobe. PET CT completed on 09/14/21 0.8cm groundglass nodule in the right upper lobe which does not demonstrate significant FDG uptake, likely below PET resolution. CT guided biopsy of the RUL nodule completed on 10/15/21 Adenocarcinoma; immunohistochemistry positive for TTF1 and Napsin, confirming lung primary. On 11/10/21, patient underwent R VATS RA RULx. On POD 0, patient with severe pain overnight and splinting. POD 1, PCA with good pain management, patient ambulated the hallways, and good IS.  On POD 2, CT removed with no PTX post pull, transitioned to oral pain meds. Patient spiked a fever overnight.  POD3 patient with fever during the morning and early afternoon, discharge cancelled. Patient encouraged to use IS aggressively and walk, though patient refusing and demanding to go home. POD4 patient with stable CXR, off oxygen and medically stable for discharge per Dr. Luong. Patient having bowel movements, tolerating po diet and ambulating in halls without need for supplemental oxygen. Patient will be discharged on augmentin for 1 week for fevers per Dr. Luong.    Over 35 minutes was spent with the patient reviewing the discharge material including medications, follow up appointments, recovery, concerning symptoms, and how to contact their health care providers if they have questions

## 2021-11-12 NOTE — DISCHARGE NOTE PROVIDER - NSDCFUADDAPPT_GEN_ALL_CORE_FT
-Please follow up with Dr. Luong on ___.  The office is located at [facility], [facility], 4th floor. Call us with any questions #[phone].      -Please also follow up with Dr. Marciano Ribeiro on ____________.  -Walk daily as tolerated and use your incentive spirometer every hour.    -No driving or strenuous activity/exercise for 6 weeks, or until cleared by your surgeon.     -Gently clean your incisions with anti-bacterial soap and water, pat dry.  You may leave them open to air.    -Call your doctor if you have shortness of breath, chest pain not relieved by pain medication, dizziness, fever >101.5, or increased redness or drainage from incisions.   -Please follow up with Dr. Luong on 11/18/21 at 9am. You will see his partner, Dr. Cross. The office is located at [facility], [facility], 4th floor. Call us with any questions #[phone].      -Please follow up with your referring doctor, Dr. Bradley on 11/29/21 at 10am at his [place] office location.

## 2021-11-12 NOTE — DISCHARGE NOTE PROVIDER - PROVIDER TOKENS
PROVIDER:[TOKEN:[[id number]]] PROVIDER:[TOKEN:[[id number]],SCHEDULEDAPPT:[11/18/2021],SCHEDULEDAPPTTIME:[09:00 AM]],PROVIDER:[TOKEN:[[id number]],SCHEDULEDAPPT:[11/29/2021],SCHEDULEDAPPTTIME:[10:00 AM]]

## 2021-11-12 NOTE — PROGRESS NOTE ADULT - SUBJECTIVE AND OBJECTIVE BOX
Patient discussed on morning rounds with Dr. Cross      Operation / Date: 11/10/21 Snow JAMES RA    SUBJECTIVE ASSESSMENT:  61y Male. NAEO. Chest tube removed this am, and PCA stopped, transitioned to oral pain meds. Patient denies cp, sob, palpiations, n/v/d/c.       Vital Signs Last 24 Hrs  T(C): 36.6 (12 Nov 2021 13:59), Max: 38.6 (11 Nov 2021 18:10)  T(F): 97.8 (12 Nov 2021 13:59), Max: 101.5 (11 Nov 2021 18:10)  HR: 90 (12 Nov 2021 13:59) (86 - 102)  BP: 141/74 (12 Nov 2021 13:59) (137/71 - 161/81)  BP(mean): 112 (12 Nov 2021 04:14) (96 - 113)  RR: 20 (12 Nov 2021 13:59) (20 - 25)  SpO2: 91% (12 Nov 2021 13:59) (91% - 98%)  I&O's Detail    11 Nov 2021 07:01  -  12 Nov 2021 07:00  --------------------------------------------------------  IN:    Lactated Ringers: 500 mL    Oral Fluid: 100 mL  Total IN: 600 mL    OUT:    Chest Tube (mL): 150 mL    Voided (mL): 900 mL  Total OUT: 1050 mL    Total NET: -450 mL      12 Nov 2021 07:01  -  12 Nov 2021 16:21  --------------------------------------------------------  IN:    Lactated Ringers: 350 mL  Total IN: 350 mL    OUT:    Chest Tube (mL): 40 mL    Voided (mL): 150 mL  Total OUT: 190 mL    Total NET: 160 mL          CHEST TUBE:  No.   LES DRAIN: No.  EPICARDIAL WIRES:  No.  TIE DOWNS: Yes.  RODRIGUEZ: No.    PHYSICAL EXAM:  Appearance: No acute distress.  Neurologic: AAOx3, no AMS or focal deficits.  Responds appropriately to verbal and physical stimuli; exhibits purposeful movement in all extremities.  Cardiovascular: RRR  Respiratory: No acute respiratory distress. CTAB  Gastrointestinal:  Soft, non-tender, non-distended, + BS.	  Extremities: warm, well perfused. No edema.  Incisions: VATS sites CDI, occlusive dressing over CT site      LABS:                        12.1   9.89  )-----------( 211      ( 11 Nov 2021 06:41 )             37.6       COUMADIN:  No.    11-11    137  |  102  |  15  ----------------------------<  163<H>  4.1   |  25  |  0.70    Ca    9.1      11 Nov 2021 11:12      MEDICATIONS  (STANDING):  acetaminophen     Tablet .. 975 milliGRAM(s) Oral every 6 hours  BUpivacaine liposome 1.3% Injectable (no eMAR) 20 milliLiter(s) Local Injection once  dextrose 40% Gel 15 Gram(s) Oral once  dextrose 5%. 1000 milliLiter(s) (50 mL/Hr) IV Continuous <Continuous>  dextrose 5%. 1000 milliLiter(s) (100 mL/Hr) IV Continuous <Continuous>  dextrose 50% Injectable 25 Gram(s) IV Push once  dextrose 50% Injectable 12.5 Gram(s) IV Push once  dextrose 50% Injectable 25 Gram(s) IV Push once  glucagon  Injectable 1 milliGRAM(s) IntraMuscular once  heparin   Injectable 5000 Unit(s) SubCutaneous every 8 hours  influenza   Vaccine 0.5 milliLiter(s) IntraMuscular once  insulin lispro (ADMELOG) corrective regimen sliding scale   SubCutaneous Before meals and at bedtime  ketorolac   Injectable 15 milliGRAM(s) IV Push every 6 hours  lactated ringers. 1000 milliLiter(s) (50 mL/Hr) IV Continuous <Continuous>  lidocaine   4% Patch 1 Patch Transdermal daily  pantoprazole    Tablet 40 milliGRAM(s) Oral before breakfast  polyethylene glycol 3350 17 Gram(s) Oral daily    MEDICATIONS  (PRN):  naloxone Injectable 0.1 milliGRAM(s) IV Push every 3 minutes PRN For ANY of the following changes in patient status:  A. RR LESS THAN 10 breaths per minute, B. Oxygen saturation LESS THAN 90%, C. Sedation score of 6  ondansetron Injectable 4 milliGRAM(s) IV Push every 6 hours PRN Nausea        RADIOLOGY & ADDITIONAL TESTS:  < from: Xray Chest 1 View-PORTABLE IMMEDIATE (Xray Chest 1 View-PORTABLE IMMEDIATE .) (11.11.21 @ 06:27) >  CLINICAL HISTORY: Follow Up.    FINDINGS:    Single frontalview of the chest demonstrates right-sided chest tube.5.8 x 8.5 cm right hilar opacity, larger in size. Chain sutures. No large pneumothorax. The cardiomediastinal silhouette is normal. No acute osseous abnormalities. Overlying EKG leads and wires are noted    IMPRESSION: Previously seen right-sided pneumothorax is not visualized.    < end of copied text >

## 2021-11-12 NOTE — DISCHARGE NOTE PROVIDER - NSDCMRMEDTOKEN_GEN_ALL_CORE_FT
acetaminophen 325 mg oral tablet: 3 tab(s) orally every 6 hours  Oxaydo 5 mg oral tablet: 1 tab(s) orally every 6 hours, As needed, Severe Pain (7 - 10) MDD:4  pantoprazole 40 mg oral delayed release tablet: 1 tab(s) orally once a day (before a meal)  polyethylene glycol 3350 oral powder for reconstitution: 17 gram(s) orally once a day   acetaminophen 325 mg oral tablet: 3 tab(s) orally every 6 hours  Augmentin 875 mg-125 mg oral tablet: 1 milligram(s) orally every 12 hours   Oxaydo 5 mg oral tablet: 1 tab(s) orally every 6 hours, As needed, Severe Pain (7 - 10) MDD:4  pantoprazole 40 mg oral delayed release tablet: 1 tab(s) orally once a day (before a meal)  polyethylene glycol 3350 oral powder for reconstitution: 17 gram(s) orally once a day

## 2021-11-12 NOTE — PROGRESS NOTE ADULT - ASSESSMENT
61 year old M, current smoker, PMHx of COPD, HTN, GERD, HLD, with recent CT chest revealing spiculated right upper lobe lung nodule. He is referred by Dr. Bradley. CT chest completed on 7/26/21:spiculated predominantly groundglass 1.5 cm nodule in the right upper lobe. PET CT completed on 09/14/21 0.8cm groundglass nodule in the right upper lobe which does not demonstrate significant FDG uptake, likely below PET resolution. CT guided biopsy of the RUL nodule completed on 10/15/21 Adenocarcinoma; immunohistochemistry positive for TTF1 and Napsin, confirming lung primary. Today patient underwent R VATS RA RULx. Seen in PACU, 1 CT on suction no leak. Severe pain overnight and splinting. Dilaudid PCA started this AM. Pain better controlled. Emphazing ISS, OBB, deep breathing. CT tube removed POD2. PCA discontinued.     Neurovascular:   - Dilaudid PCA d/c;ed  - Standing Tylenol and Toradol    Cardiovascular:  - Hemodynamically stable.   -Hx HTN, HLD- no home meds, normotensive  -monitor hr/bp/tele    Respiratory:   -02 Sat = 98% on 2lnc. s/p procedure above   -1 CT on suction, intermittent leak, continue to suction   -Encourage ambulation, C+DB and Use of IS 10x / hr while awake.  -CXR with atelectasis in RML - much improved  -removed CT   -pain control and ISS    GI:  -Protonix for GI PPX.  -Continue bowel regimen  - Regular diet    Renal / :  - BUN/Cr stable  -Monitor renal function.  -Monitor I/O's.  -Replete lytes PRN      Hematologic:   H&H stable    ID:   -complete periop abx  - no issues  -Observe for SIRS/Sepsis Syndrome.    Prophylaxis:  -DVT prophylaxis with 5000 SubQ Heparin q8h.  -SCD's    Disposition:  -home in am

## 2021-11-13 LAB
ALBUMIN SERPL ELPH-MCNC: 3.2 G/DL — LOW (ref 3.3–5)
ALP SERPL-CCNC: 58 U/L — SIGNIFICANT CHANGE UP (ref 40–120)
ALT FLD-CCNC: 22 U/L — SIGNIFICANT CHANGE UP (ref 10–45)
ANION GAP SERPL CALC-SCNC: 13 MMOL/L — SIGNIFICANT CHANGE UP (ref 5–17)
AST SERPL-CCNC: 27 U/L — SIGNIFICANT CHANGE UP (ref 10–40)
BILIRUB SERPL-MCNC: 0.4 MG/DL — SIGNIFICANT CHANGE UP (ref 0.2–1.2)
BUN SERPL-MCNC: 15 MG/DL — SIGNIFICANT CHANGE UP (ref 7–23)
CALCIUM SERPL-MCNC: 9 MG/DL — SIGNIFICANT CHANGE UP (ref 8.4–10.5)
CHLORIDE SERPL-SCNC: 102 MMOL/L — SIGNIFICANT CHANGE UP (ref 96–108)
CO2 SERPL-SCNC: 19 MMOL/L — LOW (ref 22–31)
CREAT SERPL-MCNC: 0.75 MG/DL — SIGNIFICANT CHANGE UP (ref 0.5–1.3)
GLUCOSE BLDC GLUCOMTR-MCNC: 104 MG/DL — HIGH (ref 70–99)
GLUCOSE BLDC GLUCOMTR-MCNC: 117 MG/DL — HIGH (ref 70–99)
GLUCOSE SERPL-MCNC: 104 MG/DL — HIGH (ref 70–99)
HCT VFR BLD CALC: 39.4 % — SIGNIFICANT CHANGE UP (ref 39–50)
HCT VFR BLD CALC: 41.3 % — SIGNIFICANT CHANGE UP (ref 39–50)
HGB BLD-MCNC: 12.5 G/DL — LOW (ref 13–17)
HGB BLD-MCNC: 13.5 G/DL — SIGNIFICANT CHANGE UP (ref 13–17)
MAGNESIUM SERPL-MCNC: 2.2 MG/DL — SIGNIFICANT CHANGE UP (ref 1.6–2.6)
MCHC RBC-ENTMCNC: 24.2 PG — LOW (ref 27–34)
MCHC RBC-ENTMCNC: 24.7 PG — LOW (ref 27–34)
MCHC RBC-ENTMCNC: 31.7 GM/DL — LOW (ref 32–36)
MCHC RBC-ENTMCNC: 32.7 GM/DL — SIGNIFICANT CHANGE UP (ref 32–36)
MCV RBC AUTO: 75.6 FL — LOW (ref 80–100)
MCV RBC AUTO: 76.2 FL — LOW (ref 80–100)
NRBC # BLD: 0 /100 WBCS — SIGNIFICANT CHANGE UP (ref 0–0)
NRBC # BLD: 0 /100 WBCS — SIGNIFICANT CHANGE UP (ref 0–0)
PLATELET # BLD AUTO: 244 K/UL — SIGNIFICANT CHANGE UP (ref 150–400)
PLATELET # BLD AUTO: 270 K/UL — SIGNIFICANT CHANGE UP (ref 150–400)
POTASSIUM SERPL-MCNC: 4.2 MMOL/L — SIGNIFICANT CHANGE UP (ref 3.5–5.3)
POTASSIUM SERPL-SCNC: 4.2 MMOL/L — SIGNIFICANT CHANGE UP (ref 3.5–5.3)
PROT SERPL-MCNC: 7.1 G/DL — SIGNIFICANT CHANGE UP (ref 6–8.3)
RBC # BLD: 5.17 M/UL — SIGNIFICANT CHANGE UP (ref 4.2–5.8)
RBC # BLD: 5.46 M/UL — SIGNIFICANT CHANGE UP (ref 4.2–5.8)
RBC # FLD: 13.4 % — SIGNIFICANT CHANGE UP (ref 10.3–14.5)
RBC # FLD: 13.5 % — SIGNIFICANT CHANGE UP (ref 10.3–14.5)
SODIUM SERPL-SCNC: 134 MMOL/L — LOW (ref 135–145)
WBC # BLD: 8.46 K/UL — SIGNIFICANT CHANGE UP (ref 3.8–10.5)
WBC # BLD: 9.42 K/UL — SIGNIFICANT CHANGE UP (ref 3.8–10.5)
WBC # FLD AUTO: 8.46 K/UL — SIGNIFICANT CHANGE UP (ref 3.8–10.5)
WBC # FLD AUTO: 9.42 K/UL — SIGNIFICANT CHANGE UP (ref 3.8–10.5)

## 2021-11-13 PROCEDURE — 71045 X-RAY EXAM CHEST 1 VIEW: CPT | Mod: 26

## 2021-11-13 RX ORDER — METOPROLOL TARTRATE 50 MG
5 TABLET ORAL ONCE
Refills: 0 | Status: COMPLETED | OUTPATIENT
Start: 2021-11-13 | End: 2021-11-13

## 2021-11-13 RX ORDER — METOPROLOL TARTRATE 50 MG
5 TABLET ORAL ONCE
Refills: 0 | Status: DISCONTINUED | OUTPATIENT
Start: 2021-11-13 | End: 2021-11-13

## 2021-11-13 RX ADMIN — OXYCODONE HYDROCHLORIDE 5 MILLIGRAM(S): 5 TABLET ORAL at 07:46

## 2021-11-13 RX ADMIN — OXYCODONE HYDROCHLORIDE 5 MILLIGRAM(S): 5 TABLET ORAL at 09:49

## 2021-11-13 RX ADMIN — LIDOCAINE 1 PATCH: 4 CREAM TOPICAL at 11:19

## 2021-11-13 RX ADMIN — PANTOPRAZOLE SODIUM 40 MILLIGRAM(S): 20 TABLET, DELAYED RELEASE ORAL at 05:49

## 2021-11-13 RX ADMIN — OXYCODONE HYDROCHLORIDE 5 MILLIGRAM(S): 5 TABLET ORAL at 04:46

## 2021-11-13 RX ADMIN — Medication 975 MILLIGRAM(S): at 05:49

## 2021-11-13 RX ADMIN — Medication 975 MILLIGRAM(S): at 11:18

## 2021-11-13 RX ADMIN — HEPARIN SODIUM 5000 UNIT(S): 5000 INJECTION INTRAVENOUS; SUBCUTANEOUS at 05:48

## 2021-11-13 RX ADMIN — Medication 975 MILLIGRAM(S): at 14:39

## 2021-11-13 RX ADMIN — HEPARIN SODIUM 5000 UNIT(S): 5000 INJECTION INTRAVENOUS; SUBCUTANEOUS at 14:06

## 2021-11-13 RX ADMIN — LIDOCAINE 1 PATCH: 4 CREAM TOPICAL at 07:46

## 2021-11-13 RX ADMIN — Medication 5 MILLIGRAM(S): at 14:06

## 2021-11-13 RX ADMIN — OXYCODONE HYDROCHLORIDE 5 MILLIGRAM(S): 5 TABLET ORAL at 10:57

## 2021-11-13 RX ADMIN — Medication 975 MILLIGRAM(S): at 18:30

## 2021-11-13 RX ADMIN — Medication 975 MILLIGRAM(S): at 04:48

## 2021-11-13 RX ADMIN — HEPARIN SODIUM 5000 UNIT(S): 5000 INJECTION INTRAVENOUS; SUBCUTANEOUS at 21:23

## 2021-11-13 NOTE — PROGRESS NOTE ADULT - SUBJECTIVE AND OBJECTIVE BOX
Patient discussed on morning rounds with Dr. Luong      Operation / Date: 11/10/21 Snow JAMES RA    SUBJECTIVE ASSESSMENT:  61y Male. NAEO. Patient spiked a fever >101 overnight and again this am, halting discharge. Patient without an elevated WBC. Patient denies chest pain, sob, palpitations, n/v/d/c.     Vital Signs Last 24 Hrs  T(C): 38.4 (13 Nov 2021 11:16), Max: 38.8 (13 Nov 2021 04:30)  T(F): 101.2 (13 Nov 2021 11:16), Max: 101.9 (13 Nov 2021 04:30)  HR: 102 (13 Nov 2021 14:00) (86 - 108)  BP: 137/65 (13 Nov 2021 14:00) (131/74 - 159/73)  BP(mean): 105 (13 Nov 2021 04:31) (105 - 105)  RR: 20 (13 Nov 2021 14:00) (18 - 23)  SpO2: 96% (13 Nov 2021 14:00) (94% - 97%)  I&O's Detail    12 Nov 2021 07:01  -  13 Nov 2021 07:00  --------------------------------------------------------  IN:    Lactated Ringers: 350 mL    Oral Fluid: 100 mL  Total IN: 450 mL    OUT:    Chest Tube (mL): 40 mL    Voided (mL): 1300 mL  Total OUT: 1340 mL    Total NET: -890 mL    CHEST TUBE:  No.   LES DRAIN: No.  EPICARDIAL WIRES: No.  TIE DOWNS: Yes.  RODRIGUEZ: No.    PHYSICAL EXAM:    Appearance: No acute distress.  Neurologic: AAOx3, no AMS or focal deficits.  Responds appropriately to verbal and physical stimuli; exhibits purposeful movement in all extremities.  Cardiovascular: RRR  Respiratory: No acute respiratory distress. CTAB  Gastrointestinal:  Soft, non-tender, non-distended, + BS.	  Extremities: warm, well perfused. No edema.  Incisions: VATS sites CDI, occlusive dressing over CT site    LABS:                        13.5   9.42  )-----------( 270      ( 13 Nov 2021 14:04 )             41.3       COUMADIN:  No.        11-13    134<L>  |  102  |  15  ----------------------------<  104<H>  4.2   |  19<L>  |  0.75    Ca    9.0      13 Nov 2021 06:15  Mg     2.2     11-13    TPro  7.1  /  Alb  3.2<L>  /  TBili  0.4  /  DBili  x   /  AST  27  /  ALT  22  /  AlkPhos  58  11-13    MEDICATIONS  (STANDING):  acetaminophen     Tablet .. 975 milliGRAM(s) Oral every 6 hours  BUpivacaine liposome 1.3% Injectable (no eMAR) 20 milliLiter(s) Local Injection once  glucagon  Injectable 1 milliGRAM(s) IntraMuscular once  heparin   Injectable 5000 Unit(s) SubCutaneous every 8 hours  influenza   Vaccine 0.5 milliLiter(s) IntraMuscular once  lactated ringers. 1000 milliLiter(s) (50 mL/Hr) IV Continuous <Continuous>  lidocaine   4% Patch 1 Patch Transdermal daily  pantoprazole    Tablet 40 milliGRAM(s) Oral before breakfast  polyethylene glycol 3350 17 Gram(s) Oral daily    MEDICATIONS  (PRN):  naloxone Injectable 0.1 milliGRAM(s) IV Push every 3 minutes PRN For ANY of the following changes in patient status:  A. RR LESS THAN 10 breaths per minute, B. Oxygen saturation LESS THAN 90%, C. Sedation score of 6  ondansetron Injectable 4 milliGRAM(s) IV Push every 6 hours PRN Nausea  oxyCODONE    IR 5 milliGRAM(s) Oral every 6 hours PRN Severe Pain (7 - 10)        RADIOLOGY & ADDITIONAL TESTS:  < from: Xray Chest 1 View- PORTABLE-Routine (Xray Chest 1 View- PORTABLE-Routine in AM.) (11.13.21 @ 06:17) >  INTERPRETATION:  Clinical History: Discharge    Frontal examination of the chest demonstrates right hilar mass and/or infiltrates unchanged in comparison to prior examination of the chest 11/12/2021. Small left effusion. Subcutaneous emphysema overlying right lower lateral chest wall.    IMPRESSION: No interval change right lung pathology. Small left effusion    < end of copied text >

## 2021-11-13 NOTE — PROGRESS NOTE ADULT - ASSESSMENT
61 year old M, current smoker, PMHx of COPD, HTN, GERD, HLD, with recent CT chest revealing spiculated right upper lobe lung nodule. He is referred by Dr. Bradley. CT chest completed on 7/26/21:spiculated predominantly groundglass 1.5 cm nodule in the right upper lobe. PET CT completed on 09/14/21 0.8cm groundglass nodule in the right upper lobe which does not demonstrate significant FDG uptake, likely below PET resolution. CT guided biopsy of the RUL nodule completed on 10/15/21 Adenocarcinoma; immunohistochemistry positive for TTF1 and Napsin, confirming lung primary. Today patient underwent R VATS RA RULx. Seen in PACU, 1 CT on suction no leak. Severe pain overnight and splinting. Dilaudid PCA started this AM. Pain better controlled. Emphazing ISS, OBB, deep breathing. CT tube removed POD2. PCA discontinued. Patient gearing up for discharge, however with post op fever overnight and this am, POD3. Patient planned for discharge tomorrow.    Neurovascular:   - Dilaudid PCA d/c;ed  - Standing Tylenol and Toradol    Cardiovascular:  - Hemodynamically stable.   -Hx HTN, HLD- no home meds, normotensive  -monitor hr/bp/tele    Respiratory:   -02 Sat = 98% on 2lnc. s/p procedure above   -1 CT on suction, intermittent leak, continue to suction   -Encourage ambulation, C+DB and Use of IS 10x / hr while awake.  -CXR with atelectasis in RML - much improved  -removed CT   -pain control and ISS    GI:  -Protonix for GI PPX.  -Continue bowel regimen  - Regular diet    Renal / :  - BUN/Cr stable 15/0.75  -Monitor renal function.  -Monitor I/O's.  -Replete lytes PRN      Hematologic:   H&H stable 13.5/41.3    ID:   -complete periop abx  -no issues  -WBC 9.42  -Observe for SIRS/Sepsis Syndrome.    Prophylaxis:  -DVT prophylaxis with 5000 SubQ Heparin q8h.  -SCD's    Disposition:  -home in am

## 2021-11-14 ENCOUNTER — TRANSCRIPTION ENCOUNTER (OUTPATIENT)
Age: 61
End: 2021-11-14

## 2021-11-14 VITALS — OXYGEN SATURATION: 97 % | HEART RATE: 112 BPM

## 2021-11-14 LAB
ALBUMIN SERPL ELPH-MCNC: 3.5 G/DL — SIGNIFICANT CHANGE UP (ref 3.3–5)
ALP SERPL-CCNC: 60 U/L — SIGNIFICANT CHANGE UP (ref 40–120)
ALT FLD-CCNC: 27 U/L — SIGNIFICANT CHANGE UP (ref 10–45)
ANION GAP SERPL CALC-SCNC: 12 MMOL/L — SIGNIFICANT CHANGE UP (ref 5–17)
AST SERPL-CCNC: 30 U/L — SIGNIFICANT CHANGE UP (ref 10–40)
BILIRUB SERPL-MCNC: 0.5 MG/DL — SIGNIFICANT CHANGE UP (ref 0.2–1.2)
BUN SERPL-MCNC: 15 MG/DL — SIGNIFICANT CHANGE UP (ref 7–23)
CALCIUM SERPL-MCNC: 9.2 MG/DL — SIGNIFICANT CHANGE UP (ref 8.4–10.5)
CHLORIDE SERPL-SCNC: 103 MMOL/L — SIGNIFICANT CHANGE UP (ref 96–108)
CO2 SERPL-SCNC: 19 MMOL/L — LOW (ref 22–31)
CREAT SERPL-MCNC: 0.68 MG/DL — SIGNIFICANT CHANGE UP (ref 0.5–1.3)
GLUCOSE SERPL-MCNC: 132 MG/DL — HIGH (ref 70–99)
HCT VFR BLD CALC: 40.1 % — SIGNIFICANT CHANGE UP (ref 39–50)
HGB BLD-MCNC: 13.2 G/DL — SIGNIFICANT CHANGE UP (ref 13–17)
MAGNESIUM SERPL-MCNC: 2.2 MG/DL — SIGNIFICANT CHANGE UP (ref 1.6–2.6)
MCHC RBC-ENTMCNC: 25.1 PG — LOW (ref 27–34)
MCHC RBC-ENTMCNC: 32.9 GM/DL — SIGNIFICANT CHANGE UP (ref 32–36)
MCV RBC AUTO: 76.2 FL — LOW (ref 80–100)
NRBC # BLD: 0 /100 WBCS — SIGNIFICANT CHANGE UP (ref 0–0)
PLATELET # BLD AUTO: 288 K/UL — SIGNIFICANT CHANGE UP (ref 150–400)
POTASSIUM SERPL-MCNC: 4.3 MMOL/L — SIGNIFICANT CHANGE UP (ref 3.5–5.3)
POTASSIUM SERPL-SCNC: 4.3 MMOL/L — SIGNIFICANT CHANGE UP (ref 3.5–5.3)
PROT SERPL-MCNC: 7.2 G/DL — SIGNIFICANT CHANGE UP (ref 6–8.3)
RBC # BLD: 5.26 M/UL — SIGNIFICANT CHANGE UP (ref 4.2–5.8)
RBC # FLD: 13.2 % — SIGNIFICANT CHANGE UP (ref 10.3–14.5)
SODIUM SERPL-SCNC: 134 MMOL/L — LOW (ref 135–145)
WBC # BLD: 8.7 K/UL — SIGNIFICANT CHANGE UP (ref 3.8–10.5)
WBC # FLD AUTO: 8.7 K/UL — SIGNIFICANT CHANGE UP (ref 3.8–10.5)

## 2021-11-14 PROCEDURE — 71045 X-RAY EXAM CHEST 1 VIEW: CPT | Mod: 26,76

## 2021-11-14 RX ORDER — OXYCODONE HYDROCHLORIDE 5 MG/1
1 TABLET ORAL
Qty: 12 | Refills: 0
Start: 2021-11-14 | End: 2021-11-16

## 2021-11-14 RX ORDER — PANTOPRAZOLE SODIUM 20 MG/1
1 TABLET, DELAYED RELEASE ORAL
Qty: 30 | Refills: 0
Start: 2021-11-14 | End: 2021-12-13

## 2021-11-14 RX ORDER — POLYETHYLENE GLYCOL 3350 17 G/17G
17 POWDER, FOR SOLUTION ORAL
Qty: 119 | Refills: 0
Start: 2021-11-14 | End: 2021-11-20

## 2021-11-14 RX ORDER — ACETAMINOPHEN 500 MG
3 TABLET ORAL
Qty: 360 | Refills: 0
Start: 2021-11-14 | End: 2021-12-13

## 2021-11-14 RX ADMIN — Medication 975 MILLIGRAM(S): at 05:53

## 2021-11-14 RX ADMIN — OXYCODONE HYDROCHLORIDE 5 MILLIGRAM(S): 5 TABLET ORAL at 00:06

## 2021-11-14 RX ADMIN — HEPARIN SODIUM 5000 UNIT(S): 5000 INJECTION INTRAVENOUS; SUBCUTANEOUS at 05:53

## 2021-11-14 RX ADMIN — Medication 975 MILLIGRAM(S): at 00:52

## 2021-11-14 NOTE — PROGRESS NOTE ADULT - ASSESSMENT
Follow Up:  Care Providers for Follow up (PCP/Outpatient Provider)	Jay Jay Luong)  Surgery; Thoracic Surgery  [address], [address]th [place] [address]  Phone: [phone]  Fax: [phone]  Scheduled Appointment: 11/18/2021 09:00 AM    Mateo Bradley)  Critical Care Medicine  [id number], [address]nd [place] [address]  Phone: [phone]  Fax: [phone]  Scheduled Appointment: 11/29/2021 10:00 AM  Patient's Scheduled Appointments	NATALIASANTASANJUANA F ; 11/18/2021 ; [facility] [place] [address]  Additional Scheduled Appointments	-Please follow up with Dr. Luong on 11/18/21 at 9am. You will see his partner, Dr. Cross. The office is located at [facility], [facility], 4th floor. Call us with any questions #[phone].      -Please follow up with your referring doctor, Dr. Bradley on 11/29/21 at 10am at his [place] office location.  Discharge Diet	Regular Diet - No restrictions  Activity	Do not drive or operate machinery, Do not make important decisions, No heavy lifting/straining, Showering allowed, Walking - Indoors allowed, Walking - Outdoors allowed, Follow Instructions Provided by your Surgical Team  Additional Instructions	-Walk daily as tolerated and use your incentive spirometer every hour.    -No driving or strenuous activity/exercise for 6 weeks, or until cleared by your surgeon.     -Gently clean your incisions with anti-bacterial soap and water, pat dry.  You may leave them open to air.    -Call your doctor if you have shortness of breath, chest pain not relieved by pain medication, dizziness, fever >101.5, or increased redness or drainage from incisions.

## 2021-11-14 NOTE — DISCHARGE NOTE NURSING/CASE MANAGEMENT/SOCIAL WORK - NURSING SECTION COMPLETE
[TextBox_4] : This is the initial pulmonary appointment here for this 62-year-old male with a history of arthritis, previous neck fusion surgery with rods and screws, hypertension, and COPD. He has smoked for 40 pack years and continues to do so. Was counseled on cessation today. See below. Does have a chronic cough that he has had for years and he has not some a.m. clear sputum production.  He does notice wheezing at nighttime. He does get short of breath going up one flight of stairs for is able to walk 2 blocks okay. He is maintained on Breo 200 strength  and uses Combivent Respimat 1 inhalation q.i.d. p.r.n. for rescue.  Has needed the latte recently about 2 times per day.\par \par He is not having palpitations, lightheadedness, or syncope. No fever or chills.\par \par He has not had a screening CT scan of chest,. Patient/Caregiver provided printed discharge information.

## 2021-11-14 NOTE — DISCHARGE NOTE NURSING/CASE MANAGEMENT/SOCIAL WORK - NSDCFUADDAPPT_GEN_ALL_CORE_FT
-Please follow up with Dr. Luong on 11/18/21 at 9am. You will see his partner, Dr. Cross. The office is located at [facility], [facility], 4th floor. Call us with any questions #[phone].      -Please follow up with your referring doctor, Dr. Bradley on 11/29/21 at 10am at his [place] office location.

## 2021-11-14 NOTE — PROGRESS NOTE ADULT - SUBJECTIVE AND OBJECTIVE BOX
Patient discussed on morning rounds with Dr. Luong     Operation / Date:     Surgeon:    Referring Physician:    SUBJECTIVE ASSESSMENT AND HOSPITAL COURSE:  61 year old M, current smoker, PMHx of COPD, HTN, GERD, HLD, with recent CT chest revealing spiculated right upper lobe lung nodule. He is referred by Dr. Bradley. CT chest completed on 7/26/21:spiculated predominantly groundglass 1.5 cm nodule in the right upper lobe. PET CT completed on 09/14/21 0.8cm groundglass nodule in the right upper lobe which does not demonstrate significant FDG uptake, likely below PET resolution. CT guided biopsy of the RUL nodule completed on 10/15/21 Adenocarcinoma; immunohistochemistry positive for TTF1 and Napsin, confirming lung primary. On 11/10/21, patient underwent R VATS RA RULx. On POD 0, patient with severe pain overnight and splinting. POD 1, PCA with good pain management, patient ambulated the hallways, and good IS.  On POD 2, CT removed with no PTX post pull, transitioned to oral pain meds. Patient spiked a fever overnight.  POD3 patient with fever during the morning and early afternoon, discharge cancelled. Patient encouraged to use IS aggressively and walk, though patient refusing and demanding to go home. POD4 patient with stable CXR, off oxygen and medically stable for discharge per Dr. Luong. Patient having bowel movements, tolerating po diet and ambulating in halls without need for supplemental oxygen. Patient will be discharged on augmentin for 1 week for fevers per Dr. Luong.    Over 35 minutes was spent with the patient reviewing the discharge material including medications, follow up appointments, recovery, concerning symptoms, and how to contact their health care providers if they have questions      Vital Signs Last 24 Hrs  T(C): 36.3 (14 Nov 2021 09:26), Max: 37.7 (13 Nov 2021 18:42)  T(F): 97.4 (14 Nov 2021 09:26), Max: 99.9 (13 Nov 2021 18:42)  HR: 112 (14 Nov 2021 09:55) (88 - 112)  BP: 134/72 (14 Nov 2021 08:33) (124/66 - 137/65)  BP(mean): 97 (14 Nov 2021 08:33) (93 - 97)  RR: 16 (14 Nov 2021 08:33) (16 - 20)  SpO2: 97% (14 Nov 2021 09:55) (93% - 98%)    EPICARDIAL WIRES REMOVED: Yes/No.  TIE DOWNS REMOVED: Yes/No.    PHYSICAL EXAM:    General:     Neurological:    Cardiovascular:    Respiratory:    Gastrointestinal:    Extremities:    Vascular:    Incision Sites:    LABS:                        13.2   8.70  )-----------( 288      ( 14 Nov 2021 06:30 )             40.1       COUMADIN:  No.             11-14    134<L>  |  103  |  15  ----------------------------<  132<H>  4.3   |  19<L>  |  0.68    Ca    9.2      14 Nov 2021 06:30  Mg     2.2     11-14    TPro  7.2  /  Alb  3.5  /  TBili  0.5  /  DBili  x   /  AST  30  /  ALT  27  /  AlkPhos  60  11-14          Discharge CXR:    Discharge ECHO:     Patient discussed on morning rounds with Dr. Luong     Operation / Date:  11/10/21 RVATS, RULx RA    Surgeon: Dr. Luong    Referring Physician: Dr. Bradley    SUBJECTIVE ASSESSMENT AND HOSPITAL COURSE:  61 year old M, current smoker, PMHx of COPD, HTN, GERD, HLD, with recent CT chest revealing spiculated right upper lobe lung nodule. He is referred by Dr. Bradley. CT chest completed on 7/26/21:spiculated predominantly groundglass 1.5 cm nodule in the right upper lobe. PET CT completed on 09/14/21 0.8cm groundglass nodule in the right upper lobe which does not demonstrate significant FDG uptake, likely below PET resolution. CT guided biopsy of the RUL nodule completed on 10/15/21 Adenocarcinoma; immunohistochemistry positive for TTF1 and Napsin, confirming lung primary. On 11/10/21, patient underwent R VATS RA RULx. On POD 0, patient with severe pain overnight and splinting. POD 1, PCA with good pain management, patient ambulated the hallways, and good IS.  On POD 2, CT removed with no PTX post pull, transitioned to oral pain meds. Patient spiked a fever overnight.  POD3 patient with fever during the morning and early afternoon, discharge cancelled. Patient encouraged to use IS aggressively and walk, though patient refusing and demanding to go home. POD4 patient with stable CXR, off oxygen and medically stable for discharge per Dr. Luong. Patient having bowel movements, tolerating po diet and ambulating in halls without need for supplemental oxygen. Patient will be discharged on augmentin for 1 week for fevers per Dr. Luong.  Chest tube sites with tie downs in place.    Over 35 minutes was spent with the patient reviewing the discharge material including medications, follow up appointments, recovery, concerning symptoms, and how to contact their health care providers if they have questions      Vital Signs Last 24 Hrs  T(C): 36.3 (14 Nov 2021 09:26), Max: 37.7 (13 Nov 2021 18:42)  T(F): 97.4 (14 Nov 2021 09:26), Max: 99.9 (13 Nov 2021 18:42)  HR: 112 (14 Nov 2021 09:55) (88 - 112)  BP: 134/72 (14 Nov 2021 08:33) (124/66 - 137/65)  BP(mean): 97 (14 Nov 2021 08:33) (93 - 97)  RR: 16 (14 Nov 2021 08:33) (16 - 20)  SpO2: 97% (14 Nov 2021 09:55) (93% - 98%)    EPICARDIAL WIRES REMOVED: Yes.  TIE DOWNS REMOVED:  No.    PHYSICAL EXAM:  Appearance: No acute distress.  Neurologic: AAOx3, no AMS or focal deficits.  Responds appropriately to verbal and physical stimuli; exhibits purposeful movement in all extremities.  Cardiovascular: RRR  Respiratory: No acute respiratory distress. CTAB  Gastrointestinal:  Soft, non-tender, non-distended, + BS.	  Extremities: warm, well perfused. No edema.  Incisions: VATS sites CDI, occlusive dressing over CT site; tie downs in place    LABS:                        13.2   8.70  )-----------( 288      ( 14 Nov 2021 06:30 )             40.1       COUMADIN:  No.             11-14    134<L>  |  103  |  15  ----------------------------<  132<H>  4.3   |  19<L>  |  0.68    Ca    9.2      14 Nov 2021 06:30  Mg     2.2     11-14    TPro  7.2  /  Alb  3.5  /  TBili  0.5  /  DBili  x   /  AST  30  /  ALT  27  /  AlkPhos  60  11-14      Discharge CXR:  < from: Xray Chest 1 View- PORTABLE-Routine (Xray Chest 1 View- PORTABLE-Routine in AM.) (11.14.21 @ 05:13) >    INTERPRETATION:  Clinical History: Discharge    Frontal examination of the chest demonstrates no significant change lung pathology in comparison to prior examination of the chest 11/13/2021. The heart normal limits in transverse diameter.    IMPRESSION: No significant change lung pathology. Right hilar mass and/or consolidation. Discoid change left lung base    --- End of Report ---    < end of copied text >

## 2021-11-14 NOTE — DISCHARGE NOTE NURSING/CASE MANAGEMENT/SOCIAL WORK - PATIENT PORTAL LINK FT
You can access the FollowMyHealth Patient Portal offered by [facility] by registering at the following website: [email or web address]. By joining [facility]’s FollowMyHealth portal, you will also be able to view your health information using other applications (apps) compatible with our system.

## 2021-11-16 LAB — SURGICAL PATHOLOGY STUDY: SIGNIFICANT CHANGE UP

## 2021-11-18 ENCOUNTER — OUTPATIENT (OUTPATIENT)
Dept: OUTPATIENT SERVICES | Facility: HOSPITAL | Age: 61
LOS: 1 days | End: 2021-11-18
Payer: COMMERCIAL

## 2021-11-18 ENCOUNTER — APPOINTMENT (OUTPATIENT)
Dept: THORACIC SURGERY | Facility: CLINIC | Age: 61
End: 2021-11-18
Payer: MEDICAID

## 2021-11-18 VITALS
HEIGHT: 71 IN | WEIGHT: 145 LBS | BODY MASS INDEX: 20.3 KG/M2 | RESPIRATION RATE: 18 BRPM | TEMPERATURE: 96.6 F | OXYGEN SATURATION: 97 % | DIASTOLIC BLOOD PRESSURE: 73 MMHG | HEART RATE: 115 BPM | SYSTOLIC BLOOD PRESSURE: 125 MMHG

## 2021-11-18 PROCEDURE — 71046 X-RAY EXAM CHEST 2 VIEWS: CPT | Mod: 26

## 2021-11-18 PROCEDURE — 71046 X-RAY EXAM CHEST 2 VIEWS: CPT

## 2021-11-18 PROCEDURE — 99024 POSTOP FOLLOW-UP VISIT: CPT

## 2021-11-19 DIAGNOSIS — K21.9 GASTRO-ESOPHAGEAL REFLUX DISEASE WITHOUT ESOPHAGITIS: ICD-10-CM

## 2021-11-19 DIAGNOSIS — J98.11 ATELECTASIS: ICD-10-CM

## 2021-11-19 DIAGNOSIS — E78.5 HYPERLIPIDEMIA, UNSPECIFIED: ICD-10-CM

## 2021-11-19 DIAGNOSIS — C34.11 MALIGNANT NEOPLASM OF UPPER LOBE, RIGHT BRONCHUS OR LUNG: ICD-10-CM

## 2021-11-19 DIAGNOSIS — R06.02 SHORTNESS OF BREATH: ICD-10-CM

## 2021-11-19 DIAGNOSIS — J44.9 CHRONIC OBSTRUCTIVE PULMONARY DISEASE, UNSPECIFIED: ICD-10-CM

## 2021-11-19 DIAGNOSIS — I10 ESSENTIAL (PRIMARY) HYPERTENSION: ICD-10-CM

## 2021-11-22 NOTE — DISCHARGE NOTE PROVIDER - NS AS DC PROVIDER CONTACT Y/N MULTI
Use warm compresses to the affected eye 3x/day.  Call if you develop increasing redness, swelling or pus or discharge.  Return for vaccines as needed.      Yes

## 2021-11-29 ENCOUNTER — APPOINTMENT (OUTPATIENT)
Dept: PULMONOLOGY | Facility: CLINIC | Age: 61
End: 2021-11-29
Payer: MEDICAID

## 2021-11-29 VITALS
SYSTOLIC BLOOD PRESSURE: 137 MMHG | RESPIRATION RATE: 16 BRPM | HEIGHT: 71 IN | BODY MASS INDEX: 20.44 KG/M2 | OXYGEN SATURATION: 98 % | WEIGHT: 146 LBS | DIASTOLIC BLOOD PRESSURE: 89 MMHG | HEART RATE: 112 BPM

## 2021-11-29 DIAGNOSIS — R05.9 COUGH, UNSPECIFIED: ICD-10-CM

## 2021-11-29 DIAGNOSIS — Z83.3 FAMILY HISTORY OF DIABETES MELLITUS: ICD-10-CM

## 2021-11-29 DIAGNOSIS — Z78.9 OTHER SPECIFIED HEALTH STATUS: ICD-10-CM

## 2021-11-29 DIAGNOSIS — J44.9 CHRONIC OBSTRUCTIVE PULMONARY DISEASE, UNSPECIFIED: ICD-10-CM

## 2021-11-29 DIAGNOSIS — Z81.8 FAMILY HISTORY OF OTHER MENTAL AND BEHAVIORAL DISORDERS: ICD-10-CM

## 2021-11-29 DIAGNOSIS — Z87.11 PERSONAL HISTORY OF PEPTIC ULCER DISEASE: ICD-10-CM

## 2021-11-29 PROCEDURE — 99214 OFFICE O/P EST MOD 30 MIN: CPT

## 2021-11-29 NOTE — ASSESSMENT
[FreeTextEntry1] : In summary the patient is a 61-year-old male with past medical history significant for COPD status post VATS for invasive adenocarcinoma who presents today for follow-up.  Patient's physical exam is within normal limits.  Patient is now started on Symbicort and Ventolin and instructed to follow-up in 3 months

## 2021-11-29 NOTE — HISTORY OF PRESENT ILLNESS
[Former] : former [Never] : never [TextBox_4] : \par Patient is a 61-year-old male with past medical history significant for COPD former smoker recently found to have adenocarcinoma of the lung and underwent video-assisted thoracoscopic surgery and resection for invasive adenocarcinoma.  Final pathology revealed pT1b, pN0 who presents today for further management.  The patient complains of a nonproductive cough associated with pleuritic chest pain.  He currently denies fevers chills weight loss or hemoptysis

## 2021-12-06 PROCEDURE — 88331 PATH CONSLTJ SURG 1 BLK 1SPC: CPT

## 2021-12-06 PROCEDURE — 80053 COMPREHEN METABOLIC PANEL: CPT

## 2021-12-06 PROCEDURE — 86900 BLOOD TYPING SEROLOGIC ABO: CPT

## 2021-12-06 PROCEDURE — S2900: CPT

## 2021-12-06 PROCEDURE — 94660 CPAP INITIATION&MGMT: CPT

## 2021-12-06 PROCEDURE — 88305 TISSUE EXAM BY PATHOLOGIST: CPT

## 2021-12-06 PROCEDURE — C1889: CPT

## 2021-12-06 PROCEDURE — 83735 ASSAY OF MAGNESIUM: CPT

## 2021-12-06 PROCEDURE — 85025 COMPLETE CBC W/AUTO DIFF WBC: CPT

## 2021-12-06 PROCEDURE — 82962 GLUCOSE BLOOD TEST: CPT

## 2021-12-06 PROCEDURE — 83036 HEMOGLOBIN GLYCOSYLATED A1C: CPT

## 2021-12-06 PROCEDURE — 86769 SARS-COV-2 COVID-19 ANTIBODY: CPT

## 2021-12-06 PROCEDURE — 71045 X-RAY EXAM CHEST 1 VIEW: CPT

## 2021-12-06 PROCEDURE — 88332 PATH CONSLTJ SURG EA ADD BLK: CPT

## 2021-12-06 PROCEDURE — 36415 COLL VENOUS BLD VENIPUNCTURE: CPT

## 2021-12-06 PROCEDURE — 88309 TISSUE EXAM BY PATHOLOGIST: CPT

## 2021-12-06 PROCEDURE — 86850 RBC ANTIBODY SCREEN: CPT

## 2021-12-06 PROCEDURE — 85730 THROMBOPLASTIN TIME PARTIAL: CPT

## 2021-12-06 PROCEDURE — 85610 PROTHROMBIN TIME: CPT

## 2021-12-06 PROCEDURE — 85027 COMPLETE CBC AUTOMATED: CPT

## 2021-12-06 PROCEDURE — 80048 BASIC METABOLIC PNL TOTAL CA: CPT

## 2021-12-06 PROCEDURE — 86901 BLOOD TYPING SEROLOGIC RH(D): CPT

## 2021-12-06 PROCEDURE — C9399: CPT

## 2021-12-09 ENCOUNTER — APPOINTMENT (OUTPATIENT)
Dept: THORACIC SURGERY | Facility: CLINIC | Age: 61
End: 2021-12-09
Payer: MEDICAID

## 2021-12-16 ENCOUNTER — APPOINTMENT (OUTPATIENT)
Dept: THORACIC SURGERY | Facility: CLINIC | Age: 61
End: 2021-12-16
Payer: MEDICAID

## 2021-12-16 ENCOUNTER — OUTPATIENT (OUTPATIENT)
Dept: OUTPATIENT SERVICES | Facility: HOSPITAL | Age: 61
LOS: 1 days | End: 2021-12-16
Payer: COMMERCIAL

## 2021-12-16 VITALS
SYSTOLIC BLOOD PRESSURE: 131 MMHG | RESPIRATION RATE: 18 BRPM | TEMPERATURE: 97.1 F | WEIGHT: 138 LBS | BODY MASS INDEX: 19.32 KG/M2 | HEIGHT: 71 IN | OXYGEN SATURATION: 97 % | HEART RATE: 102 BPM | DIASTOLIC BLOOD PRESSURE: 80 MMHG

## 2021-12-16 PROCEDURE — 99024 POSTOP FOLLOW-UP VISIT: CPT

## 2021-12-16 PROCEDURE — 71046 X-RAY EXAM CHEST 2 VIEWS: CPT | Mod: 26

## 2021-12-16 PROCEDURE — 71046 X-RAY EXAM CHEST 2 VIEWS: CPT

## 2021-12-23 ENCOUNTER — LABORATORY RESULT (OUTPATIENT)
Age: 61
End: 2021-12-23

## 2021-12-23 ENCOUNTER — APPOINTMENT (OUTPATIENT)
Dept: HEMATOLOGY ONCOLOGY | Facility: CLINIC | Age: 61
End: 2021-12-23
Payer: MEDICAID

## 2021-12-23 VITALS
DIASTOLIC BLOOD PRESSURE: 86 MMHG | WEIGHT: 139 LBS | BODY MASS INDEX: 19.46 KG/M2 | HEART RATE: 103 BPM | RESPIRATION RATE: 18 BRPM | OXYGEN SATURATION: 99 % | TEMPERATURE: 98.2 F | SYSTOLIC BLOOD PRESSURE: 144 MMHG | HEIGHT: 71 IN

## 2021-12-23 DIAGNOSIS — R63.4 ABNORMAL WEIGHT LOSS: ICD-10-CM

## 2021-12-23 LAB
ALBUMIN SERPL ELPH-MCNC: 3.9 G/DL
ALP BLD-CCNC: 75 U/L
ALT SERPL-CCNC: 12 U/L
ANION GAP SERPL CALC-SCNC: 4 MMOL/L
AST SERPL-CCNC: 22 U/L
BILIRUB SERPL-MCNC: 0.7 MG/DL
BUN SERPL-MCNC: 10 MG/DL
CALCIUM SERPL-MCNC: 10.5 MG/DL
CHLORIDE SERPL-SCNC: 108 MMOL/L
CO2 SERPL-SCNC: 29 MMOL/L
CREAT SERPL-MCNC: 0.9 MG/DL
GLUCOSE SERPL-MCNC: 101 MG/DL
POTASSIUM SERPL-SCNC: 5 MMOL/L
PROT SERPL-MCNC: 8 G/DL
SODIUM SERPL-SCNC: 141 MMOL/L

## 2021-12-23 PROCEDURE — 99204 OFFICE O/P NEW MOD 45 MIN: CPT

## 2021-12-23 RX ORDER — CHOLECALCIFEROL (VITAMIN D3) 1250 MCG
1.25 MG CAPSULE ORAL
Qty: 6 | Refills: 0 | Status: DISCONTINUED | COMMUNITY
Start: 2021-04-28 | End: 2021-12-23

## 2021-12-23 RX ORDER — ACETAMINOPHEN 325 MG/1
325 TABLET ORAL
Refills: 0 | Status: ACTIVE | COMMUNITY
Start: 2021-12-23

## 2021-12-23 RX ORDER — HYDROCODONE BITARTRATE AND HOMATROPINE METHYLBROMIDE 5; 1.5 MG/5ML; MG/5ML
5-1.5 SYRUP ORAL
Qty: 473 | Refills: 0 | Status: DISCONTINUED | COMMUNITY
Start: 2021-11-29 | End: 2021-12-23

## 2021-12-23 NOTE — HISTORY OF PRESENT ILLNESS
[de-identified] : Mr. Machuca is 61 year old male, current smoker(2 packs/d for 30 years, stopped 6 months ago) with history of  substance abuse (30 years ago), COPD, HTN, GERD, and HLD who presents to clinic today for evaluation of newly diagnosed Stage IA pT1bN0 RUL adenocarcinoma, s/p right upper lobectomy with mediastinal and hilar lymph node dissection with Dr. Luong on 11/10/21, referred by Dr. Luong. \par \par No family history of cancer, last EGD/colonoscopy 4 years ago, reportedly normal.\par \par ONC Hx: \par Mr. Machuca was referred to Dr. Luong by Dr. Bradley for CT chest on 7/26/21 revealing 1.5 cm spiculated right upper lobe lung nodule. PET CT was completed on 09/14/21 which showed 0.8cm groundglass nodule in the right upper lobe which does not demonstrate significant FDG uptake, likely below PET resolution. He underwent CT guided biopsy of the RUL nodule on 10/15/21, pathology showed Adenocarcinoma; immunohistochemistry positive for TTF1 and Napsin, confirming lung primary. He underwent right upper lobectomy with mediastinal and hilar lymph node dissection with Dr. Luong on 11/10/21, pathology confirmed invasive adenocarcinoma, measuring 1.3 cm, pT1bN0, all margins are uninvolved by tumor, 0/11 lymph nodes. Of note, he has lost over 20lbs since diagnosis d/t low appetite and stress\par \par 7/26/21 CT CHEST\par 1. mild emphysema \par 2. spiculated predominantly groundglass 1.5 cm nodule in the right upper lobe with a 4 mm solid center series 3 image 66 \par 3. atelectasis and scarring in the lung bases \par \par 9/14/21 PET/CT \par 1. 0.8 cm groundglass nodule in the right upper lobe which does not demonstrate significant FDG uptake, likely below PET resolution. No mediastinal lymphadenopathy. \par 2. Mild right greater than left FDG uptake within the peripheral zone of the prostate. Correlation with PSA is recommended. \par 3. Mild thyromegaly with FDG uptake. Correlation with thyroid ultrasound is recommended. \par \par 10/13/21 CT guided biopsy of right upper lobe lung nodule. \par Surgical Pathology Report \par Final Diagnosis \par Right upper lobe lung, core biopsy: \par - Adenocarcinoma (see note). \par Note: Immunostains will be reported in an addendum (1D). \par Addendum \par Immunohistochemistry shows the tumor to be positive for both TTF-1 and Napsin, confirming lung primary (1D). \par \par RESULTS \par PD-L1 IMMUNOHISTOCHEMICAL ANALYSIS: \par Tumor Proportion Score: <1% / Negative \par \par [facility] Advanced Lung Cancer NGS Report \par RESULT SUMMARY: ABNORMAL \par DETECTED GENOMIC ALTERATIONS: \par Tier II: Variants of Potential Clinical Significance \par KRAS p.([id number]) \par TP53 p.([id number]) \par Tier III: Variants of Unknown Clinical Significance \par ERBB2 p.([id number]) \par TUMOR TYPE: Adenocarcinoma \par \par IMMUNOTHERAPY BIOMARKERS: \par TUMOR MUTATION BURDEN: LOW (8.6 MUTATIONS / MB) \par MICROSATELLITE INSTABILITY: MSI NEGATIVE \par \par PERTINENT NEGATIVE RESULTS: \par The following genes are NEGATIVE for clinically relevant mutations. \par Mutational hotspots and surrounding exonic regions were interrogated for DNA level point mutations and indels (fusions not assayed). \par AKT1, ALK, BRAF, DDR2, EGFR, FGFR1, MAP2K1, MET, NRAS, NTRK1, PIK3CA, POLD1, POLE, ROS1, STK11, TERT \par THERAPEUTIC ASSOCIATIONS \par In Other Tumor Type \par Gene / Locus:  KRAS \par Alteration:  p.[id number] \par Potential Therapeutic Response / Drug Class:    Associated with Decreased Sensitivity to  Anti-EGFR Therapeutic Agents, Cetuximab and Panitumumab \par \par 11/5/21 CTA chest PE \par 1. A 16 x 12 mm part solid nodule with a 6 mm solid component in the right upper lobe is consistent with adenocarcinoma. MPVR images produced for preoperative planning. \par 2. Severe emphysema. \par \par 11/10/21  right upper lobectomy with mediastinal and hilar lymph node dissection \par Surgical Pathology Report  \par Final Diagnosis \par 1. Right level eleven lymph node: \par - One lymph node negative for tumor (0/1). \par 2. Right level 12 lymph node: \par - One lymph node negative for tumor (0/1). \par 3. Right level 10  lymph node: \par - One lymph node negative for tumor (0/1). \par 4. Right level 10 #2 lymph node: \par - One lymph node negative for tumor (0/1). \par 5. Right level 2 and 4 lymph node dissection: \par - Five lymph nodes negative for tumor (0/5). \par 6. Right upper lung lobe, lobectomy: \par - Invasive adenocarcinoma, measuring 1.3 cm. \par - Negative for pleural invasion. \par - Bronchial and vascular margins are negative for tumor. \par - One lymph node negative for tumor (0/1). \par 7. Right level 7 lymph node: \par - One lymph node negative for tumor (0/1). \par \par Synoptic Summary \par LUNG \par SPECIMEN \par Synchronous Tumors:  Not applicable \par Procedure:  Lobectomy \par Specimen Laterality:  Right \par TUMOR \par Tumor Site: Upper lobe of lung \par Histologic Type:  Invasive adenocarcinoma, acinar predominant \par Total Tumor Size (size of entire tumor): Greatest Dimension \par (Centimeters) -  1.3 cm \par Size of Invasive Component: Greatest Dimension (Centimeters) -    1.3 cm \par Percentage of Total Tumor Size:   95% \par Single focus \par Tumor Focality: \par Visceral Pleura Invasion:  Not identified \par Direct Invasion of Adjacent Structures:   No adjacent structures present \par Treatment Effect:  No known presurgical therapy \par Lymphovascular Invasion:  Not identified \par MARGINS \par Margins: All margins are uninvolved by tumor \par Margins Examined:  Bronchial, Vascular \par Distance of Invasive Carcinoma from Closest Margin (Centimeters):    3cm \par Closest Margin:  Bronchial \par Distance of Carcinoma in Situ from Closest Margin (Centimeters): At least - 3 cm \par Closest Margin:  Bronchial \par LYMPH NODES \par Number of Lymph Nodes Involved:   0 \par Number of Lymph Nodes Examined:   11 \par Juan Jose Stations Examined:   2R: Upper paratracheal, 4R: Lower \par paratracheal, 10R: Hilar, 11R: Interlobar, 12R: Lobar, 7: Subcarinal \par PATHOLOGIC STAGE CLASSIFICATION (pTNM, AJCC 8th Edition) \par TNM Descriptors: Not applicable \par Primary Tumor (pT):  pT1b \par Regional Lymph Nodes (pN):  pN0 \par Distant Metastases (pM):  Not applicable - pM cannot be determined from the submitted specimen(s) \par \par   [de-identified] : Patient presents to clinic for an initial consultation. He had lobectomy on 11/10/21.Since the surgery, he has lost weight over 20 lbs d/t low appetite and stress.  He has history of smoking 2 packs/d for 30 years. Patient quit 6 months ago. Reports dry cough with post nasal drip and intermittent mild discomfort in the surgical area. Able to perform ADLs by himself. Denies fever, SOB, N/V/C/D, hemoptysis, or abdominal pain.

## 2021-12-23 NOTE — REVIEW OF SYSTEMS
[Fatigue] : fatigue [Recent Change In Weight] : ~T recent weight change [Cough] : cough [SOB on Exertion] : shortness of breath during exertion [Joint Stiffness] : joint stiffness [Insomnia] : insomnia [Fever] : no fever [Chills] : no chills [Night Sweats] : no night sweats [Eye Pain] : no eye pain [Red Eyes] : eyes not red [Dry Eyes] : no dryness of the eyes [Vision Problems] : no vision problems [Dysphagia] : no dysphagia [Loss of Hearing] : no loss of hearing [Nosebleeds] : no nosebleeds [Hoarseness] : no hoarseness [Odynophagia] : no odynophagia [Mucosal Pain] : no mucosal pain [Chest Pain] : no chest pain [Palpitations] : no palpitations [Leg Claudication] : no intermittent leg claudication [Lower Ext Edema] : no lower extremity edema [Shortness Of Breath] : no shortness of breath [Wheezing] : no wheezing [Abdominal Pain] : no abdominal pain [Vomiting] : no vomiting [Constipation] : no constipation [Diarrhea] : no diarrhea [Dysuria] : no dysuria [Incontinence] : no incontinence [Joint Pain] : no joint pain [Muscle Pain] : no muscle pain [Skin Rash] : no skin rash [Skin Wound] : no skin wound [Confused] : no confusion [Dizziness] : no dizziness [Fainting] : no fainting [Difficulty Walking] : no difficulty walking [Suicidal] : not suicidal [Anxiety] : no anxiety [Depression] : no depression [Proptosis] : no proptosis [Hot Flashes] : no hot flashes [Muscle Weakness] : no muscle weakness [Deepening Of The Voice] : no deepening of the voice [Easy Bleeding] : no tendency for easy bleeding [Easy Bruising] : no tendency for easy bruising [Swollen Glands] : no swollen glands [FreeTextEntry2] : decreased appetite, lost about 20 pounds since diagnosis [FreeTextEntry8] : pt states sometimes he has urgency and other times it takes a while to urinate

## 2021-12-23 NOTE — ASSESSMENT
[FreeTextEntry1] : Mr. Machuca is 61 year old male, current smoker(2 packs/d for 30 years, stopped 6 months ago) with newly diagnosed Stage IA pT1bN0 RUL adenocarcinoma,   measuring 1.3 cm, all margins are uninvolved by tumor, 0/11 lymph nodes, PD-L1 -ve, s/p right upper lobectomy with mediastinal and hilar lymph node dissection with Dr. Luong on 11/10/21, \par \par Plan\par # Stage IA pT1bN0 RUL adenocarcinoma,all margins -ve, 0/11 lymph nodes, PD-L1 -ve, s/p right upper lobectomy with mediastinal and hilar lymph node dissection on 11/10/21\par -Reviewed pathology and scans with patient. Answered all questions \par -No role for adjuvant therapy as NCCN. \par -Will do active surveillance including repeat CT chest every 3-6 months for 2-3 years. \par -CBC, CMP\par -Received J&J. Recommend booster\par -Nutrition consult for weight loss\par -SW f/u\par -ct f/u with Dr. Cross\par \par RTC in March 2022 after CT chest w/contrast.

## 2021-12-29 ENCOUNTER — NON-APPOINTMENT (OUTPATIENT)
Age: 61
End: 2021-12-29

## 2022-03-14 PROBLEM — Z85.118 HISTORY OF LUNG CANCER: Status: ACTIVE | Noted: 2022-03-14

## 2022-03-17 ENCOUNTER — NON-APPOINTMENT (OUTPATIENT)
Age: 62
End: 2022-03-17

## 2022-03-17 ENCOUNTER — APPOINTMENT (OUTPATIENT)
Dept: THORACIC SURGERY | Facility: CLINIC | Age: 62
End: 2022-03-17
Payer: MEDICAID

## 2022-03-17 ENCOUNTER — OUTPATIENT (OUTPATIENT)
Dept: OUTPATIENT SERVICES | Facility: HOSPITAL | Age: 62
LOS: 1 days | End: 2022-03-17
Payer: COMMERCIAL

## 2022-03-17 VITALS
TEMPERATURE: 96.6 F | OXYGEN SATURATION: 96 % | RESPIRATION RATE: 18 BRPM | SYSTOLIC BLOOD PRESSURE: 140 MMHG | BODY MASS INDEX: 18.9 KG/M2 | DIASTOLIC BLOOD PRESSURE: 91 MMHG | WEIGHT: 135 LBS | HEART RATE: 98 BPM | HEIGHT: 71 IN

## 2022-03-17 DIAGNOSIS — Z01.818 ENCOUNTER FOR OTHER PREPROCEDURAL EXAMINATION: ICD-10-CM

## 2022-03-17 DIAGNOSIS — Z87.898 PERSONAL HISTORY OF OTHER SPECIFIED CONDITIONS: ICD-10-CM

## 2022-03-17 DIAGNOSIS — Z09 ENCOUNTER FOR FOLLOW-UP EXAMINATION AFTER COMPLETED TREATMENT FOR CONDITIONS OTHER THAN MALIGNANT NEOPLASM: ICD-10-CM

## 2022-03-17 DIAGNOSIS — Z85.118 PERSONAL HISTORY OF OTHER MALIGNANT NEOPLASM OF BRONCHUS AND LUNG: ICD-10-CM

## 2022-03-17 DIAGNOSIS — R91.1 SOLITARY PULMONARY NODULE: ICD-10-CM

## 2022-03-17 DIAGNOSIS — R06.89 OTHER ABNORMALITIES OF BREATHING: ICD-10-CM

## 2022-03-17 DIAGNOSIS — Z87.09 PERSONAL HISTORY OF OTHER DISEASES OF THE RESPIRATORY SYSTEM: ICD-10-CM

## 2022-03-17 DIAGNOSIS — Z87.19 PERSONAL HISTORY OF OTHER DISEASES OF THE DIGESTIVE SYSTEM: ICD-10-CM

## 2022-03-17 DIAGNOSIS — Z86.79 PERSONAL HISTORY OF OTHER DISEASES OF THE CIRCULATORY SYSTEM: ICD-10-CM

## 2022-03-17 DIAGNOSIS — Z87.891 PERSONAL HISTORY OF NICOTINE DEPENDENCE: ICD-10-CM

## 2022-03-17 DIAGNOSIS — R07.89 OTHER CHEST PAIN: ICD-10-CM

## 2022-03-17 PROCEDURE — 71046 X-RAY EXAM CHEST 2 VIEWS: CPT

## 2022-03-17 PROCEDURE — 71046 X-RAY EXAM CHEST 2 VIEWS: CPT | Mod: 26

## 2022-03-17 PROCEDURE — 99213 OFFICE O/P EST LOW 20 MIN: CPT

## 2022-03-17 NOTE — PHYSICAL EXAM
[] : no respiratory distress [Auscultation Breath Sounds / Voice Sounds] : lungs were clear to auscultation bilaterally [Heart Rate And Rhythm] : heart rate was normal and rhythm regular [Heart Sounds] : normal S1 and S2 [Heart Sounds Gallop] : no gallops [Murmurs] : no murmurs [Heart Sounds Pericardial Friction Rub] : no pericardial rub [Abnormal Walk] : normal gait [Nail Clubbing] : no clubbing  or cyanosis of the fingernails [Musculoskeletal - Swelling] : no joint swelling seen [Motor Tone] : muscle strength and tone were normal [Deep Tendon Reflexes (DTR)] : deep tendon reflexes were 2+ and symmetric [Sensation] : the sensory exam was normal to light touch and pinprick [No Focal Deficits] : no focal deficits

## 2022-03-17 NOTE — ASSESSMENT
[FreeTextEntry1] : 62 year old male, former smoker (quit 11/2021), with a PMHx of COPD, HTN, GERD, HLD, Stage IA lung adenocarcinoma.  He is now 4 months s/p Right VATS RA RULx on 11/10/21 with pathology pT1bN0 0/11 lymph nodes involved Invasive Adenocarcinoma. He presents today with a CXR. He was referred by Dr. Bradley.\par \par Patient reports feeling well in general, breathing had improved significantly. C/O intermittent tingling over right lateral chest surgical incision site, with no pain, redness, or swelling. Admits poor appetite, didn't gain much weight after surgery. However, trying to adjust his diet. Otherwise, there's no fever, fatigue, cough, dyspnea. \par \par I reviewed the CXR with the patient, stable post-op change. Patient is doing well, tinglings sensation is most likely peripheral neuropathy related. No intervention is needed since it's mild and improving.  I recommend him to continue smoking cessation and seeking consultation from nutritionist.  Will follow up in two months with a chest CT. \par \par Plan:\par 1. CT chest in two months. \par 2. Refer to nutritionist as needed. \par \par ROSALINDA Hill, was scribe [and  if needed] for and in the presence of Dr. LENNY MARROQUIN for the following sections: history of present illness, past medical/surgical/family/social/ allergy history, review of systems, vital signs, physical exam, and disposition.\par \par I, Lenny Marroquin,  personally performed the services described in the documentation, reviewed the documentation recorded by the scribe in my presence and it accurately and completely records my words and actions.\par \par \par

## 2022-03-17 NOTE — HISTORY OF PRESENT ILLNESS
[FreeTextEntry1] : 62 year old male, former smoker (quit 11/2021), with a PMHx of COPD, HTN, GERD, HLD, Stage IA lung adenocarcinoma.  He is now 4 months s/p Right VATS RA RULx on 11/10/21 with pathology pT1b, pN0 0/11 lymph nodes involved Invasive Adenocarcinoma. He presents today with a CXR. He was referred by Dr. Bradley.\par \par CT chest completed on 7/26/21:\par - mild emphysema\par - spiculated predominantly groundglass 1.5 cm nodule in the right upper lobe with a 4 mm solid center series 3 image 66\par - atelectasis and scarring in the lung bases\par \par PFT 8/25/21\par - FVC 3.7, 74%\par - FEV1 2.82, 75%\par - DLCO 55% \par \par PET CT completed on 09/14/21:\par -0.8cm groundglass nodule in the right upper lobe which does not demonstrate significant FDG uptake, likely below PET resolution. No mediastinal lymphadenopathy\par -mild right greater than left FDG uptake within the peripheral zone of the prostate. Correlation with PSA is recommended\par -mild thyromegaly with FDG uptake. Correlation with thyroid ultrasound is recommended. \par \par CT guided biopsy of the RUL nodule completed on 10/15/21:\par -Adenocarcinoma; immunohistochemistry positive for TTF1 and Napsin, confirming lung primary \par \par Surgical Pathology RULx on 11/10/21:\par pT1b, pN0 0/11 lymph nodes involved Invasive Adenocarcinoma

## 2022-03-21 ENCOUNTER — APPOINTMENT (OUTPATIENT)
Dept: CT IMAGING | Facility: HOSPITAL | Age: 62
End: 2022-03-21

## 2022-04-01 ENCOUNTER — RX RENEWAL (OUTPATIENT)
Age: 62
End: 2022-04-01

## 2022-04-01 RX ORDER — BUDESONIDE AND FORMOTEROL FUMARATE DIHYDRATE 80; 4.5 UG/1; UG/1
80-4.5 AEROSOL RESPIRATORY (INHALATION) TWICE DAILY
Qty: 1 | Refills: 2 | Status: ACTIVE | COMMUNITY
Start: 2021-11-29 | End: 1900-01-01

## 2022-04-01 RX ORDER — BUDESONIDE AND FORMOTEROL FUMARATE DIHYDRATE 80; 4.5 UG/1; UG/1
80-4.5 AEROSOL RESPIRATORY (INHALATION)
Qty: 10.2 | Refills: 3 | Status: ACTIVE | COMMUNITY
Start: 2022-04-01 | End: 1900-01-01

## 2022-06-07 ENCOUNTER — APPOINTMENT (OUTPATIENT)
Dept: CT IMAGING | Facility: HOSPITAL | Age: 62
End: 2022-06-07

## 2022-06-07 ENCOUNTER — OUTPATIENT (OUTPATIENT)
Dept: OUTPATIENT SERVICES | Facility: HOSPITAL | Age: 62
LOS: 1 days | End: 2022-06-07
Payer: COMMERCIAL

## 2022-06-07 PROCEDURE — 71250 CT THORAX DX C-: CPT

## 2022-06-07 PROCEDURE — 71250 CT THORAX DX C-: CPT | Mod: 26

## 2022-06-15 ENCOUNTER — APPOINTMENT (OUTPATIENT)
Dept: THORACIC SURGERY | Facility: CLINIC | Age: 62
End: 2022-06-15
Payer: MEDICAID

## 2022-06-15 PROCEDURE — 99441: CPT

## 2022-06-15 NOTE — HISTORY OF PRESENT ILLNESS
[FreeTextEntry1] : 62 year old male, former smoker (quit 11/2021), with a PMHx of COPD, HTN, GERD, HLD, Stage IA lung adenocarcinoma. He is now 4 months s/p Right VATS RA RULx on 11/10/21 with pathology pT1b, pN0 0/11 lymph nodes involved Invasive Adenocarcinoma. He presents today to review CT chest. \par \par CT chest completed on 06/07/22:\par -since 11/5/2021, there has been right upper lobectomy\par -a 5mm groundglass opacity nodule in the left lower lobe has not changed since 7/26/2021. \par \par

## 2022-06-15 NOTE — ASSESSMENT
[FreeTextEntry1] : 62 year old male, former smoker (quit 11/2021), with a PMHx of COPD, HTN, GERD, HLD, Stage IA lung adenocarcinoma. He is now 4 months s/p Right VATS RA RULx on 11/10/21 with pathology pT1b, pN0 0/11 lymph nodes involved Invasive Adenocarcinoma. He presents today to review CT chest. \par \par CT chest completed on 06/07/22:\par -since 11/5/2021, there has been right upper lobectomy\par -a 5mm groundglass opacity nodule in the left lower lobe has not changed since 7/26/2021. \par \par Patient denies cough, hemoptysis, shortness of breath, weight change, nausea, vomiting, diarrhea, chest pain, fever, or any recent illnesses or hospitalizations. \par \par CT chest was reviewed and without evidence of recurrence. Will plan for a CT chest in six months for continued surveillance. \par \par Plan:\par 1. CT chest in six months \par \par

## 2023-01-25 ENCOUNTER — NON-APPOINTMENT (OUTPATIENT)
Age: 63
End: 2023-01-25

## 2023-02-07 ENCOUNTER — APPOINTMENT (OUTPATIENT)
Dept: CT IMAGING | Facility: HOSPITAL | Age: 63
End: 2023-02-07
Payer: MEDICAID

## 2023-02-08 ENCOUNTER — NON-APPOINTMENT (OUTPATIENT)
Age: 63
End: 2023-02-08

## 2023-02-22 ENCOUNTER — APPOINTMENT (OUTPATIENT)
Dept: PULMONOLOGY | Facility: CLINIC | Age: 63
End: 2023-02-22

## 2023-04-03 NOTE — HISTORY OF PRESENT ILLNESS
[FreeTextEntry1] : 63 year old male, former smoker (quit 11/2021), with a PMHx of COPD, HTN, GERD, HLD, Stage IA lung adenocarcinoma. He is now 4 months s/p Right VATS RA RULx on 11/10/21 with pathology pT1b, pN0 0/11 lymph nodes involved Invasive Adenocarcinoma. He presents today for a 6 month follow up visit with a repeat  CT chest. \par \par

## 2023-04-03 NOTE — ASSESSMENT
[FreeTextEntry1] : 63 year old male, former smoker (quit 11/2021), with a PMHx of COPD, HTN, GERD, HLD, Stage IA lung adenocarcinoma. He is now 4 months s/p Right VATS RA RULx on 11/10/21 with pathology pT1b, pN0 0/11 lymph nodes involved Invasive Adenocarcinoma. He presents today for a 6 month follow up visit with a repeat CT chest \par \par CT chest \par \par \par \par Plan:\par

## 2023-04-03 NOTE — DATA REVIEWED
[FreeTextEntry1] : CT chest completed on 06/07/22:\par -since 11/5/2021, there has been right upper lobectomy\par -a 5mm groundglass opacity nodule in the left lower lobe has not changed since 7/26/2021. \par

## 2023-04-06 ENCOUNTER — APPOINTMENT (OUTPATIENT)
Dept: THORACIC SURGERY | Facility: CLINIC | Age: 63
End: 2023-04-06
Payer: MEDICAID

## 2023-04-19 ENCOUNTER — OUTPATIENT (OUTPATIENT)
Dept: OUTPATIENT SERVICES | Facility: HOSPITAL | Age: 63
LOS: 1 days | End: 2023-04-19
Payer: COMMERCIAL

## 2023-04-19 ENCOUNTER — APPOINTMENT (OUTPATIENT)
Dept: CT IMAGING | Facility: HOSPITAL | Age: 63
End: 2023-04-19

## 2023-04-19 PROCEDURE — 71250 CT THORAX DX C-: CPT | Mod: 26

## 2023-04-19 PROCEDURE — 71250 CT THORAX DX C-: CPT

## 2023-04-27 ENCOUNTER — APPOINTMENT (OUTPATIENT)
Dept: THORACIC SURGERY | Facility: CLINIC | Age: 63
End: 2023-04-27
Payer: MEDICAID

## 2023-04-27 DIAGNOSIS — C34.11 MALIGNANT NEOPLASM OF UPPER LOBE, RIGHT BRONCHUS OR LUNG: ICD-10-CM

## 2023-04-27 DIAGNOSIS — C34.90 MALIGNANT NEOPLASM OF UNSPECIFIED PART OF UNSPECIFIED BRONCHUS OR LUNG: ICD-10-CM

## 2023-04-27 PROCEDURE — 99442: CPT

## 2023-04-28 PROBLEM — C34.11 MALIGNANT NEOPLASM OF UPPER LOBE OF RIGHT LUNG: Status: ACTIVE | Noted: 2021-10-21

## 2023-04-28 PROBLEM — C34.90 ADENOCARCINOMA OF LUNG: Status: ACTIVE | Noted: 2021-10-27

## 2023-04-28 NOTE — HISTORY OF PRESENT ILLNESS
[FreeTextEntry1] : 63 year old male, former smoker (quit 11/2021), with a PMHx of COPD, HTN, GERD, HLD, Stage IA lung adenocarcinoma. He is now 4 months s/p Right VATS RA RULx on 11/10/21 with pathology pT1b, pN0 0/11 lymph nodes involved Invasive Adenocarcinoma. He presents today for a follow up visit with a repeat CT chest.\par \par CT chest  4/19/23:\par -Since 6/7/2022, status post right upper lobectomy. No evidence of local disease recurrence or metastasis within the field-of-view. Additional stable findings as above.\par \par Patient has no active complaints today.

## 2023-04-28 NOTE — DATA REVIEWED
[FreeTextEntry1] : CT chest completed on 06/07/22:\par -since 11/5/2021, there has been right upper lobectomy\par -a 5mm groundglass opacity nodule in the left lower lobe has not changed since 7/26/2021.

## 2023-04-28 NOTE — ASSESSMENT
[FreeTextEntry1] : 63 year old male, former smoker (quit 11/2021), with a PMHx of COPD, HTN, GERD, HLD. He is s/p Right VATS robot assisted upper lobectomy on 11/10/21 with pathology pT1bN0, Stage IA. No indication for adjuvant chemotherapy. He presents today for a follow up visit with a repeat CT chest.\par \par Imaging was reviewed with the patient, there is no evidence of local disease recurrence or metastasis. Will continue to surveil with a CT chest in 6 months, after which the patient can switch to a yearly surveillance schedule. All questions were answered and concerns were addressed. \par \par Plan:\par -6 month CT scan \par \par ITEODORO ANSMAN , am scribing for and in the presence of LOPEZ GALVEZ  the following sections: history of present illness, past medical/family/surgical/family/social history, review of systems, vital signs, physical exam, and disposition.\par \par I LOPEZ GALVEZ , personally performed the service described in the documentation, reviewed the documentation recorded by the scribe in my presence and it accurately and completely records my words and actions.\par \par

## 2023-08-14 ENCOUNTER — APPOINTMENT (OUTPATIENT)
Dept: PULMONOLOGY | Facility: CLINIC | Age: 63
End: 2023-08-14

## 2023-08-25 ENCOUNTER — APPOINTMENT (OUTPATIENT)
Dept: INTERNAL MEDICINE | Facility: CLINIC | Age: 63
End: 2023-08-25

## 2023-10-23 ENCOUNTER — APPOINTMENT (OUTPATIENT)
Dept: CT IMAGING | Facility: HOSPITAL | Age: 63
End: 2023-10-23

## 2023-11-01 ENCOUNTER — NON-APPOINTMENT (OUTPATIENT)
Age: 63
End: 2023-11-01

## 2023-11-02 ENCOUNTER — APPOINTMENT (OUTPATIENT)
Dept: THORACIC SURGERY | Facility: CLINIC | Age: 63
End: 2023-11-02

## 2023-12-29 PROBLEM — C34.91 CARCINOMA OF RIGHT LUNG: Status: ACTIVE | Noted: 2021-12-16

## 2023-12-29 PROBLEM — Z08 ENCOUNTER FOR FOLLOW-UP SURVEILLANCE OF LUNG CANCER: Status: ACTIVE | Noted: 2023-04-28

## 2024-01-04 ENCOUNTER — APPOINTMENT (OUTPATIENT)
Dept: THORACIC SURGERY | Facility: CLINIC | Age: 64
End: 2024-01-04

## 2024-01-04 DIAGNOSIS — Z85.118 ENCOUNTER FOR FOLLOW-UP EXAMINATION AFTER COMPLETED TREATMENT FOR MALIGNANT NEOPLASM: ICD-10-CM

## 2024-01-04 DIAGNOSIS — C34.91 MALIGNANT NEOPLASM OF UNSPECIFIED PART OF RIGHT BRONCHUS OR LUNG: ICD-10-CM

## 2024-01-04 DIAGNOSIS — Z08 ENCOUNTER FOR FOLLOW-UP EXAMINATION AFTER COMPLETED TREATMENT FOR MALIGNANT NEOPLASM: ICD-10-CM

## 2024-01-16 ENCOUNTER — APPOINTMENT (OUTPATIENT)
Dept: CT IMAGING | Facility: HOSPITAL | Age: 64
End: 2024-01-16

## 2024-01-22 ENCOUNTER — NON-APPOINTMENT (OUTPATIENT)
Age: 64
End: 2024-01-22

## 2024-01-24 ENCOUNTER — NON-APPOINTMENT (OUTPATIENT)
Age: 64
End: 2024-01-24

## 2024-01-25 ENCOUNTER — APPOINTMENT (OUTPATIENT)
Dept: THORACIC SURGERY | Facility: CLINIC | Age: 64
End: 2024-01-25

## 2024-04-09 ENCOUNTER — NON-APPOINTMENT (OUTPATIENT)
Age: 64
End: 2024-04-09

## 2024-05-21 ENCOUNTER — OUTPATIENT (OUTPATIENT)
Dept: OUTPATIENT SERVICES | Facility: HOSPITAL | Age: 64
LOS: 1 days | End: 2024-05-21
Payer: MEDICAID

## 2024-05-21 ENCOUNTER — APPOINTMENT (OUTPATIENT)
Dept: CT IMAGING | Facility: HOSPITAL | Age: 64
End: 2024-05-21

## 2024-05-21 PROCEDURE — 71250 CT THORAX DX C-: CPT

## 2024-05-21 PROCEDURE — 71250 CT THORAX DX C-: CPT | Mod: 26

## 2024-05-30 ENCOUNTER — APPOINTMENT (OUTPATIENT)
Dept: THORACIC SURGERY | Facility: CLINIC | Age: 64
End: 2024-05-30

## 2024-05-30 NOTE — DATA REVIEWED
[FreeTextEntry1] : CT chest 4/19/23: -Since 6/7/2022, status post right upper lobectomy. No evidence of local disease recurrence or metastasis within the field-of-view. Additional stable findings as above.

## 2024-05-30 NOTE — HISTORY OF PRESENT ILLNESS
[FreeTextEntry1] : Mr. Contreras is a 23-year-old M, former smoker (quit 11/2021), with a PMHx of COPD, HENRIQUEZ, HTN, GERD, HLD, prior TB exposure.  On 11/10/2021, he underwent Right VATS RA RUL lobectomy for Stage IA2, pT1b, pN0 Invasive Adenocarcinoma. He has been under surveillance since.   Last CT chest done in April 2023, stable.  He presents today for a follow up visit to review a [facility] CT Chest:  CT Chest on 05/21/2024 - Since 2021, unchanged 4 mm groundglass nodule in left lower lobe (5:204).  - Since 4/19/2023, new scattered endobronchial opacities in anterior basilar right lower lobe (5:174) and posterior basilar right lower lobe (5:191)  - Paraseptal and centrilobular emphysema. Unchanged few bilateral scattered linear opacities/scarring. Unchanged bilateral cylindrical bronchiectasis.

## 2024-05-30 NOTE — ASSESSMENT
[FreeTextEntry1] : Patient is a 64 year old former smoker who underwent right upper lobectomy and lymph node dissection on 11/10/21 for pT1bN0 invasive adenocarcinoma. He presents today to review his surveillance scan.   Patient is nearly 3 years from surgery. His CT scan shows no signs of recurrence or metastases. Would recommend a CT scan in 6 months and then yearly thereafter. He is otherwise doing well and understood the plan.   PLAN 1.  CT chest in 6 months    I, Dr. Cross, personally performed the evaluation and management (E/M) services for this established patient who presents today with (a) new problem(s)/exacerbation of (an) existing condition(s). That E/M includes conducting the clinically appropriate interval history &/or exam, assessing all new/exacerbated conditions, and establishing a new plan of care. Today, my IRMA, [name], was here to observe my evaluation and management service for this new problem/exacerbated condition and follow the plan of care established by me going forward.

## 2024-07-19 ENCOUNTER — NON-APPOINTMENT (OUTPATIENT)
Age: 64
End: 2024-07-19

## 2024-09-04 NOTE — DISCHARGE NOTE PROVIDER - NSDCHOSPICE_GEN_A_CORE
Referring Physician (Optional): SUELLEN Lopez Surgeon (Optional): Yamilet Biopsy Photograph Reviewed: Yes Accession #: [id number] Size Of Lesion In Cm: 2.9 Size Of Margin In Cm: 0.2 Anesthesia Volume In Cc: 18.4 Was An Eye Clamp Used?: No Eye Clamp Note Details: An eye clamp was used during the procedure. Excision Method: Elliptical Saucerization Depth: dermis and superficial adipose tissue Repair Type: Complex Intermediate / Complex Repair - Final Wound Length In Cm: 6.4 Suturegard Retention Suture: 2-0 Nylon Retention Suture Bite Size: 3 mm Length To Time In Minutes Device Was In Place: 10 Number Of Hemigard Strips Per Side: 1 Width Of Defect Perpendicular To Closure In Cm (Required): 2.5 Distance Of Undermining In Cm (Required): 3.1 Undermining Type: Entire Wound Debridement Text: The wound edges were debrided prior to proceeding with the closure to facilitate wound healing. Helical Rim Text: The closure involved the helical rim. No Vermilion Border Text: The closure involved the vermilion border. Nostril Rim Text: The closure involved the nostril rim. Retention Suture Text: Retention sutures were placed to support the closure and prevent dehiscence. Primary Defect Length (In Cm): 0 Lab: 253 Lab Facility:  Graft Donor Site Bandage (Optional-Leave Blank If You Don't Want In Note): Steri-strips and a pressure bandage were applied to the donor site. Epidermal Closure Graft Donor Site (Optional): simple interrupted Billing Type: Third-Party Bill Excision Depth: adipose tissue Scalpel Size: 15 blade Anesthesia Type: 1% lidocaine with epinephrine Additional Anesthesia Volume In Cc: 6 Hemostasis: Electrocautery Estimated Blood Loss (Cc): minimal Detail Level: Detailed Repair Depth: use same depth as excision depth Repair Anesthesia Method: local infiltration Anesthesia Volume In Cc: 12 Deep Sutures: 2-0 Vicryl Dermal Closure: buried vertical mattress Epidermal Sutures: 4-0 Vicryl Rapide Epidermal Closure: running subcuticular Wound Care: Bacitracin Dressing: dry sterile dressing Suturegard Intro: Intraoperative tissue expansion was performed, utilizing the SUTUREGARD device, in order to reduce wound tension. Suturegard Body: The suture ends were repeatedly re-tightened and re-clamped to achieve the desired tissue expansion. Hemigard Intro: Due to skin fragility and wound tension, it was decided to use HEMIGARD adhesive retention suture devices to permit a linear closure. The skin was cleaned and dried for a 6cm distance away from the wound. Excessive hair, if present, was removed to allow for adhesion. Hemigard Postcare Instructions: The HEMIGARD strips are to remain completely dry for at least 5-7 days. Positioning (Leave Blank If You Do Not Want): The patient was placed in a comfortable position exposing the surgical site. Pre-Excision Curettage Text (Leave Blank If You Do Not Want): Prior to drawing the surgical margin the visible lesion was removed with electrodesiccation and curettage to clearly define the lesion size. Complex Repair Preamble Text (Leave Blank If You Do Not Want): Extensive wide undermining was performed. Intermediate Repair Preamble Text (Leave Blank If You Do Not Want): Undermining was performed with blunt dissection. Curvilinear Excision Additional Text (Leave Blank If You Do Not Want): The margin was drawn around the clinically apparent lesion.  A curvilinear shape was then drawn on the skin incorporating the lesion and margins.  Incisions were then made along these lines to the appropriate tissue plane and the lesion was extirpated. Fusiform Excision Additional Text (Leave Blank If You Do Not Want): The margin was drawn around the clinically apparent lesion.  A fusiform shape was then drawn on the skin incorporating the lesion and margins.  Incisions were then made along these lines to the appropriate tissue plane and the lesion was extirpated. Elliptical Excision Additional Text (Leave Blank If You Do Not Want): The margin was drawn around the clinically apparent lesion.  An elliptical shape was then drawn on the skin incorporating the lesion and margins.  Incisions were then made along these lines to the appropriate tissue plane and the lesion was extirpated. Saucerization Excision Additional Text (Leave Blank If You Do Not Want): The margin was drawn around the clinically apparent lesion.  Incisions were then made along these lines, in a tangential fashion, to the appropriate tissue plane and the lesion was extirpated. Slit Excision Additional Text (Leave Blank If You Do Not Want): A linear line was drawn on the skin overlying the lesion. An incision was made slowly until the lesion was visualized.  Once visualized, the lesion was removed with blunt dissection. Excisional Biopsy Additional Text (Leave Blank If You Do Not Want): The margin was drawn around the clinically apparent lesion. An elliptical shape was then drawn on the skin incorporating the lesion and margins.  Incisions were then made along these lines to the appropriate tissue plane and the lesion was extirpated. Perilesional Excision Additional Text (Leave Blank If You Do Not Want): The margin was drawn around the clinically apparent lesion. Incisions were then made along these lines to the appropriate tissue plane and the lesion was extirpated. Repair Performed By Another Provider Text (Leave Blank If You Do Not Want): After the tissue was excised the defect was repaired by another provider. No Repair - Repaired With Adjacent Surgical Defect Text (Leave Blank If You Do Not Want): After the excision the defect was repaired concurrently with another surgical defect which was in close approximation. Adjacent Tissue Transfer Text: The defect edges were debeveled with a #15 scalpel blade. Given the location of the defect and the proximity to free margins an adjacent tissue transfer was deemed most appropriate. Using a sterile surgical marker, an appropriate flap was drawn incorporating the defect and placing the expected incisions within the relaxed skin tension lines where possible. The area thus outlined was incised deep to adipose tissue with a #15 scalpel blade. The skin margins were undermined to an appropriate distance in all directions utilizing iris scissors and carried over to close the primary defect. Advancement Flap (Single) Text: The defect edges were debeveled with a #15 scalpel blade.  Given the location of the defect and the proximity to free margins a single advancement flap was deemed most appropriate.  Using a sterile surgical marker, an appropriate advancement flap was drawn incorporating the defect and placing the expected incisions within the relaxed skin tension lines where possible.    The area thus outlined was incised deep to adipose tissue with a #15 scalpel blade.  The skin margins were undermined to an appropriate distance in all directions utilizing iris scissors. Advancement Flap (Double) Text: The defect edges were debeveled with a #15 scalpel blade.  Given the location of the defect and the proximity to free margins a double advancement flap was deemed most appropriate.  Using a sterile surgical marker, the appropriate advancement flaps were drawn incorporating the defect and placing the expected incisions within the relaxed skin tension lines where possible.    The area thus outlined was incised deep to adipose tissue with a #15 scalpel blade.  The skin margins were undermined to an appropriate distance in all directions utilizing iris scissors. Burow's Advancement Flap Text: The defect edges were debeveled with a #15 scalpel blade.  Given the location of the defect and the proximity to free margins a Burow's advancement flap was deemed most appropriate.  Using a sterile surgical marker, the appropriate advancement flap was drawn incorporating the defect and placing the expected incisions within the relaxed skin tension lines where possible.    The area thus outlined was incised deep to adipose tissue with a #15 scalpel blade.  The skin margins were undermined to an appropriate distance in all directions utilizing iris scissors. Chonodrocutaneous Helical Advancement Flap Text: The defect edges were debeveled with a #15 scalpel blade. Given the location of the defect and the proximity to free margins a chondrocutaneous helical advancement flap was deemed most appropriate. Using a sterile surgical marker, the appropriate advancement flap was drawn incorporating the defect and placing the expected incisions within the relaxed skin tension lines where possible. The area thus outlined was incised deep to adipose tissue with a #15 scalpel blade. The skin margins were undermined to an appropriate distance in all directions utilizing iris scissors. Following this, the designed flap was advanced and carried over into the primary defect and sutured into place. Crescentic Advancement Flap Text: The defect edges were debeveled with a #15 scalpel blade.  Given the location of the defect and the proximity to free margins a crescentic advancement flap was deemed most appropriate.  Using a sterile surgical marker, the appropriate advancement flap was drawn incorporating the defect and placing the expected incisions within the relaxed skin tension lines where possible.    The area thus outlined was incised deep to adipose tissue with a #15 scalpel blade.  The skin margins were undermined to an appropriate distance in all directions utilizing iris scissors. A-T Advancement Flap Text: The defect edges were debeveled with a #15 scalpel blade.  Given the location of the defect, shape of the defect and the proximity to free margins an A-T advancement flap was deemed most appropriate.  Using a sterile surgical marker, an appropriate advancement flap was drawn incorporating the defect and placing the expected incisions within the relaxed skin tension lines where possible.    The area thus outlined was incised deep to adipose tissue with a #15 scalpel blade.  The skin margins were undermined to an appropriate distance in all directions utilizing iris scissors. O-T Advancement Flap Text: The defect edges were debeveled with a #15 scalpel blade.  Given the location of the defect, shape of the defect and the proximity to free margins an O-T advancement flap was deemed most appropriate.  Using a sterile surgical marker, an appropriate advancement flap was drawn incorporating the defect and placing the expected incisions within the relaxed skin tension lines where possible.    The area thus outlined was incised deep to adipose tissue with a #15 scalpel blade.  The skin margins were undermined to an appropriate distance in all directions utilizing iris scissors. O-L Flap Text: The defect edges were debeveled with a #15 scalpel blade.  Given the location of the defect, shape of the defect and the proximity to free margins an O-L flap was deemed most appropriate.  Using a sterile surgical marker, an appropriate advancement flap was drawn incorporating the defect and placing the expected incisions within the relaxed skin tension lines where possible.    The area thus outlined was incised deep to adipose tissue with a #15 scalpel blade.  The skin margins were undermined to an appropriate distance in all directions utilizing iris scissors. O-Z Flap Text: The defect edges were debeveled with a #15 scalpel blade. Given the location of the defect, shape of the defect and the proximity to free margins an O-Z flap was deemed most appropriate. Using a sterile surgical marker, an appropriate transposition flap was drawn incorporating the defect and placing the expected incisions within the relaxed skin tension lines where possible. The area thus outlined was incised deep to adipose tissue with a #15 scalpel blade. The skin margins were undermined to an appropriate distance in all directions utilizing iris scissors. Following this, the designed flap was carried over into the primary defect and sutured into place. Double O-Z Flap Text: The defect edges were debeveled with a #15 scalpel blade. Given the location of the defect, shape of the defect and the proximity to free margins a Double O-Z flap was deemed most appropriate. Using a sterile surgical marker, an appropriate transposition flap was drawn incorporating the defect and placing the expected incisions within the relaxed skin tension lines where possible. The area thus outlined was incised deep to adipose tissue with a #15 scalpel blade. The skin margins were undermined to an appropriate distance in all directions utilizing iris scissors. Following this, the designed flap was carried over into the primary defect and sutured into place. V-Y Flap Text: The defect edges were debeveled with a #15 scalpel blade.  Given the location of the defect, shape of the defect and the proximity to free margins a V-Y flap was deemed most appropriate.  Using a sterile surgical marker, an appropriate advancement flap was drawn incorporating the defect and placing the expected incisions within the relaxed skin tension lines where possible.    The area thus outlined was incised deep to adipose tissue with a #15 scalpel blade.  The skin margins were undermined to an appropriate distance in all directions utilizing iris scissors. Advancement-Rotation Flap Text: The defect edges were debeveled with a #15 scalpel blade.  Given the location of the defect, shape of the defect and the proximity to free margins an advancement-rotation flap was deemed most appropriate.  Using a sterile surgical marker, an appropriate flap was drawn incorporating the defect and placing the expected incisions within the relaxed skin tension lines where possible. The area thus outlined was incised deep to adipose tissue with a #15 scalpel blade.  The skin margins were undermined to an appropriate distance in all directions utilizing iris scissors. Mercedes Flap Text: The defect edges were debeveled with a #15 scalpel blade. Given the location of the defect, shape of the defect and the proximity to free margins a Mercedes flap was deemed most appropriate. Using a sterile surgical marker, an appropriate advancement flap was drawn incorporating the defect and placing the expected incisions within the relaxed skin tension lines where possible. The area thus outlined was incised deep to adipose tissue with a #15 scalpel blade. The skin margins were undermined to an appropriate distance in all directions utilizing iris scissors. Following this, the designed flap was advanced and carried over into the primary defect and sutured into place. Modified Advancement Flap Text: The defect edges were debeveled with a #15 scalpel blade.  Given the location of the defect, shape of the defect and the proximity to free margins a modified advancement flap was deemed most appropriate.  Using a sterile surgical marker, an appropriate advancement flap was drawn incorporating the defect and placing the expected incisions within the relaxed skin tension lines where possible.    The area thus outlined was incised deep to adipose tissue with a #15 scalpel blade.  The skin margins were undermined to an appropriate distance in all directions utilizing iris scissors. Mucosal Advancement Flap Text: Given the location of the defect, shape of the defect and the proximity to free margins a mucosal advancement flap was deemed most appropriate. Incisions were made with a 15 blade scalpel in the appropriate fashion along the cutaneous vermilion border and the mucosal lip. The remaining actinically damaged mucosal tissue was excised.  The mucosal advancement flap was then elevated to the gingival sulcus with care taken to preserve the neurovascular structures and advanced into the primary defect. Care was taken to ensure that precise realignment of the vermilion border was achieved. Peng Advancement Flap Text: The defect edges were debeveled with a #15 scalpel blade. Given the location of the defect, shape of the defect and the proximity to free margins a Peng advancement flap was deemed most appropriate. Using a sterile surgical marker, an appropriate advancement flap was drawn incorporating the defect and placing the expected incisions within the relaxed skin tension lines where possible. The area thus outlined was incised deep to adipose tissue with a #15 scalpel blade. The skin margins were undermined to an appropriate distance in all directions utilizing iris scissors. Following this, the designed flap was advanced and carried over into the primary defect and sutured into place. Hatchet Flap Text: The defect edges were debeveled with a #15 scalpel blade.  Given the location of the defect, shape of the defect and the proximity to free margins a hatchet flap was deemed most appropriate.  Using a sterile surgical marker, an appropriate hatchet flap was drawn incorporating the defect and placing the expected incisions within the relaxed skin tension lines where possible.    The area thus outlined was incised deep to adipose tissue with a #15 scalpel blade.  The skin margins were undermined to an appropriate distance in all directions utilizing iris scissors. Rotation Flap Text: The defect edges were debeveled with a #15 scalpel blade.  Given the location of the defect, shape of the defect and the proximity to free margins a rotation flap was deemed most appropriate.  Using a sterile surgical marker, an appropriate rotation flap was drawn incorporating the defect and placing the expected incisions within the relaxed skin tension lines where possible.    The area thus outlined was incised deep to adipose tissue with a #15 scalpel blade.  The skin margins were undermined to an appropriate distance in all directions utilizing iris scissors. Bilateral Rotation Flap Text: The defect edges were debeveled with a #15 scalpel blade. Given the location of the defect, shape of the defect and the proximity to free margins a bilateral rotation flap was deemed most appropriate. Using a sterile surgical marker, an appropriate rotation flap was drawn incorporating the defect and placing the expected incisions within the relaxed skin tension lines where possible. The area thus outlined was incised deep to adipose tissue with a #15 scalpel blade. The skin margins were undermined to an appropriate distance in all directions utilizing iris scissors. Following this, the designed flap was carried over into the primary defect and sutured into place. Spiral Flap Text: The defect edges were debeveled with a #15 scalpel blade.  Given the location of the defect, shape of the defect and the proximity to free margins a spiral flap was deemed most appropriate.  Using a sterile surgical marker, an appropriate rotation flap was drawn incorporating the defect and placing the expected incisions within the relaxed skin tension lines where possible. The area thus outlined was incised deep to adipose tissue with a #15 scalpel blade.  The skin margins were undermined to an appropriate distance in all directions utilizing iris scissors. Staged Advancement Flap Text: The defect edges were debeveled with a #15 scalpel blade. Given the location of the defect, shape of the defect and the proximity to free margins a staged advancement flap was deemed most appropriate. Using a sterile surgical marker, an appropriate advancement flap was drawn incorporating the defect and placing the expected incisions within the relaxed skin tension lines where possible. The area thus outlined was incised deep to adipose tissue with a #15 scalpel blade. The skin margins were undermined to an appropriate distance in all directions utilizing iris scissors. Following this, the designed flap was carried over into the primary defect and sutured into place. Star Wedge Flap Text: The defect edges were debeveled with a #15 scalpel blade.  Given the location of the defect, shape of the defect and the proximity to free margins a star wedge flap was deemed most appropriate.  Using a sterile surgical marker, an appropriate rotation flap was drawn incorporating the defect and placing the expected incisions within the relaxed skin tension lines where possible. The area thus outlined was incised deep to adipose tissue with a #15 scalpel blade.  The skin margins were undermined to an appropriate distance in all directions utilizing iris scissors. Transposition Flap Text: The defect edges were debeveled with a #15 scalpel blade.  Given the location of the defect and the proximity to free margins a transposition flap was deemed most appropriate.  Using a sterile surgical marker, an appropriate transposition flap was drawn incorporating the defect.    The area thus outlined was incised deep to adipose tissue with a #15 scalpel blade.  The skin margins were undermined to an appropriate distance in all directions utilizing iris scissors. Muscle Hinge Flap Text: The defect edges were debeveled with a #15 scalpel blade.  Given the size, depth and location of the defect and the proximity to free margins a muscle hinge flap was deemed most appropriate.  Using a sterile surgical marker, an appropriate hinge flap was drawn incorporating the defect. The area thus outlined was incised with a #15 scalpel blade.  The skin margins were undermined to an appropriate distance in all directions utilizing iris scissors. Mustarde Flap Text: The defect edges were debeveled with a #15 scalpel blade.  Given the size, depth and location of the defect and the proximity to free margins a Mustarde flap was deemed most appropriate. Using a sterile surgical marker, an appropriate flap was drawn incorporating the defect. The area thus outlined was incised with a #15 scalpel blade. The skin margins were undermined to an appropriate distance in all directions utilizing iris scissors. Following this, the designed flap was carried into the primary defect and sutured into place. Nasal Turnover Hinge Flap Text: The defect edges were debeveled with a #15 scalpel blade.  Given the size, depth, location of the defect and the defect being full thickness a nasal turnover hinge flap was deemed most appropriate. Using a sterile surgical marker, an appropriate hinge flap was drawn incorporating the defect. The area thus outlined was incised with a #15 scalpel blade. The flap was designed to recreate the nasal mucosal lining and the alar rim. The skin margins were undermined to an appropriate distance in all directions utilizing iris scissors. Following this, the designed flap was carried over into the primary defect and sutured into place Nasalis-Muscle-Based Myocutaneous Island Pedicle Flap Text: Using a #15 blade, an incision was made around the donor flap to the level of the nasalis muscle. Wide lateral undermining was then performed in both the subcutaneous plane above the nasalis muscle, and in a submuscular plane just above periosteum. This allowed the formation of a free nasalis muscle axial pedicle (based on the angular artery) which was still attached to the actual cutaneous flap, increasing its mobility and vascular viability. Hemostasis was obtained with pinpoint electrocoagulation. The flap was mobilized into position and the pivotal anchor points positioned and stabilized with buried interrupted sutures. Subcutaneous and dermal tissues were closed in a multilayered fashion with sutures. Tissue redundancies were excised, and the epidermal edges were apposed without significant tension and sutured with sutures. Nasalis Myocutaneous Flap Text: Using a #15 blade, an incision was made around the donor flap to the level of the nasalis muscle. Wide lateral undermining was then performed in both the subcutaneous plane above the nasalis muscle, and in a submuscular plane just above periosteum. This allowed the formation of a free nasalis muscle axial pedicle which was still attached to the actual cutaneous flap, increasing its mobility and vascular viability. Hemostasis was obtained with pinpoint electrocoagulation. The flap was mobilized into position and the pivotal anchor points positioned and stabilized with buried interrupted sutures. Subcutaneous and dermal tissues were closed in a multilayered fashion with sutures. Tissue redundancies were excised, and the epidermal edges were apposed without significant tension and sutured with sutures. Nasolabial Transposition Flap Text: The defect edges were debeveled with a #15 scalpel blade.  Given the size, depth and location of the defect and the proximity to free margins a nasolabial transposition flap was deemed most appropriate. Using a sterile surgical marker, an appropriate flap was drawn incorporating the defect. The area thus outlined was incised with a #15 scalpel blade. The skin margins were undermined to an appropriate distance in all directions utilizing iris scissors. Following this, the designed flap was carried into the primary defect and sutured into place. Orbicularis Oris Muscle Flap Text: The defect edges were debeveled with a #15 scalpel blade.  Given that the defect affected the competency of the oral sphincter an orbicularis oris muscle flap was deemed most appropriate to restore this competency and normal muscle function.  Using a sterile surgical marker, an appropriate flap was drawn incorporating the defect. The area thus outlined was incised with a #15 scalpel blade. Following this, the designed flap was carried over into the primary defect and sutured into place. Melolabial Transposition Flap Text: The defect edges were debeveled with a #15 scalpel blade.  Given the location of the defect and the proximity to free margins a melolabial flap was deemed most appropriate.  Using a sterile surgical marker, an appropriate melolabial transposition flap was drawn incorporating the defect.    The area thus outlined was incised deep to adipose tissue with a #15 scalpel blade.  The skin margins were undermined to an appropriate distance in all directions utilizing iris scissors. Rectangular Flap Text: The defect edges were debeveled with a #15 scalpel blade. Given the location of the defect and the proximity to free margins a rectangular flap was deemed most appropriate. Using a sterile surgical marker, an appropriate rectangular flap was drawn incorporating the defect. The area thus outlined was incised deep to adipose tissue with a #15 scalpel blade. The skin margins were undermined to an appropriate distance in all directions utilizing iris scissors. Following this, the designed flap was carried over into the primary defect and sutured into place. Rhombic Flap Text: The defect edges were debeveled with a #15 scalpel blade.  Given the location of the defect and the proximity to free margins a rhombic flap was deemed most appropriate.  Using a sterile surgical marker, an appropriate rhombic flap was drawn incorporating the defect.    The area thus outlined was incised deep to adipose tissue with a #15 scalpel blade.  The skin margins were undermined to an appropriate distance in all directions utilizing iris scissors. Rhomboid Transposition Flap Text: The defect edges were debeveled with a #15 scalpel blade. Given the location of the defect and the proximity to free margins a rhomboid transposition flap was deemed most appropriate. Using a sterile surgical marker, an appropriate rhomboid flap was drawn incorporating the defect. The area thus outlined was incised deep to adipose tissue with a #15 scalpel blade. The skin margins were undermined to an appropriate distance in all directions utilizing iris scissors. Following this, the designed flap was carried over into the primary defect and sutured into place. Bi-Rhombic Flap Text: The defect edges were debeveled with a #15 scalpel blade.  Given the location of the defect and the proximity to free margins a bi-rhombic flap was deemed most appropriate.  Using a sterile surgical marker, an appropriate rhombic flap was drawn incorporating the defect. The area thus outlined was incised deep to adipose tissue with a #15 scalpel blade.  The skin margins were undermined to an appropriate distance in all directions utilizing iris scissors. Helical Rim Advancement Flap Text: The defect edges were debeveled with a #15 blade scalpel.  Given the location of the defect and the proximity to free margins (helical rim) a double helical rim advancement flap was deemed most appropriate.  Using a sterile surgical marker, the appropriate advancement flaps were drawn incorporating the defect and placing the expected incisions between the helical rim and antihelix where possible.  The area thus outlined was incised through and through with a #15 scalpel blade.  With a skin hook and iris scissors, the flaps were gently and sharply undermined and freed up. Bilateral Helical Rim Advancement Flap Text: The defect edges were debeveled with a #15 blade scalpel.  Given the location of the defect and the proximity to free margins (helical rim) a bilateral helical rim advancement flap was deemed most appropriate.  Using a sterile surgical marker, the appropriate advancement flaps were drawn incorporating the defect and placing the expected incisions between the helical rim and antihelix where possible.  The area thus outlined was incised through and through with a #15 scalpel blade.  With a skin hook and iris scissors, the flaps were gently and sharply undermined and freed up. Ear Star Wedge Flap Text: The defect edges were debeveled with a #15 blade scalpel.  Given the location of the defect and the proximity to free margins (helical rim) an ear star wedge flap was deemed most appropriate.  Using a sterile surgical marker, the appropriate flap was drawn incorporating the defect and placing the expected incisions between the helical rim and antihelix where possible.  The area thus outlined was incised through and through with a #15 scalpel blade. Flip-Flop Flap Text: The defect edges were debeveled with a #15 blade scalpel.  Given the location of the defect and the proximity to free margins a flip-flop flap was deemed most appropriate. Using a sterile surgical marker, the appropriate flap was drawn incorporating the defect and placing the expected incisions between the helical rim and antihelix where possible.  The area thus outlined was incised through and through with a #15 scalpel blade. Following this, the designed flap was carried over into the primary defect and sutured into place. Banner Transposition Flap Text: The defect edges were debeveled with a #15 scalpel blade. Given the location of the defect and the proximity to free margins a Banner transposition flap was deemed most appropriate. Using a sterile surgical marker, an appropriate flap was drawn around the defect. The area thus outlined was incised deep to adipose tissue with a #15 scalpel blade. The skin margins were undermined to an appropriate distance in all directions utilizing iris scissors. Following this, the designed flap was carried into the primary defect and sutured into place. Bilobed Flap Text: The defect edges were debeveled with a #15 scalpel blade.  Given the location of the defect and the proximity to free margins a bilobe flap was deemed most appropriate.  Using a sterile surgical marker, an appropriate bilobe flap drawn around the defect.    The area thus outlined was incised deep to adipose tissue with a #15 scalpel blade.  The skin margins were undermined to an appropriate distance in all directions utilizing iris scissors. Bilobed Transposition Flap Text: The defect edges were debeveled with a #15 scalpel blade.  Given the location of the defect and the proximity to free margins a bilobed transposition flap was deemed most appropriate.  Using a sterile surgical marker, an appropriate bilobe flap drawn around the defect.    The area thus outlined was incised deep to adipose tissue with a #15 scalpel blade.  The skin margins were undermined to an appropriate distance in all directions utilizing iris scissors. Trilobed Flap Text: The defect edges were debeveled with a #15 scalpel blade.  Given the location of the defect and the proximity to free margins a trilobed flap was deemed most appropriate.  Using a sterile surgical marker, an appropriate trilobed flap drawn around the defect.    The area thus outlined was incised deep to adipose tissue with a #15 scalpel blade.  The skin margins were undermined to an appropriate distance in all directions utilizing iris scissors. Dorsal Nasal Flap Text: The defect edges were debeveled with a #15 scalpel blade.  Given the location of the defect and the proximity to free margins a dorsal nasal flap was deemed most appropriate.  Using a sterile surgical marker, an appropriate dorsal nasal flap was drawn around the defect.    The area thus outlined was incised deep to adipose tissue with a #15 scalpel blade.  The skin margins were undermined to an appropriate distance in all directions utilizing iris scissors. Island Pedicle Flap Text: The defect edges were debeveled with a #15 scalpel blade.  Given the location of the defect, shape of the defect and the proximity to free margins an island pedicle advancement flap was deemed most appropriate.  Using a sterile surgical marker, an appropriate advancement flap was drawn incorporating the defect, outlining the appropriate donor tissue and placing the expected incisions within the relaxed skin tension lines where possible.    The area thus outlined was incised deep to adipose tissue with a #15 scalpel blade.  The skin margins were undermined to an appropriate distance in all directions around the primary defect and laterally outward around the island pedicle utilizing iris scissors.  There was minimal undermining beneath the pedicle flap. Island Pedicle Flap With Canthal Suspension Text: The defect edges were debeveled with a #15 scalpel blade.  Given the location of the defect, shape of the defect and the proximity to free margins an island pedicle advancement flap was deemed most appropriate.  Using a sterile surgical marker, an appropriate advancement flap was drawn incorporating the defect, outlining the appropriate donor tissue and placing the expected incisions within the relaxed skin tension lines where possible. The area thus outlined was incised deep to adipose tissue with a #15 scalpel blade.  The skin margins were undermined to an appropriate distance in all directions around the primary defect and laterally outward around the island pedicle utilizing iris scissors.  There was minimal undermining beneath the pedicle flap. A suspension suture was placed in the canthal tendon to prevent tension and prevent ectropion. Alar Island Pedicle Flap Text: The defect edges were debeveled with a #15 scalpel blade.  Given the location of the defect, shape of the defect and the proximity to the alar rim an island pedicle advancement flap was deemed most appropriate.  Using a sterile surgical marker, an appropriate advancement flap was drawn incorporating the defect, outlining the appropriate donor tissue and placing the expected incisions within the nasal ala running parallel to the alar rim. The area thus outlined was incised with a #15 scalpel blade.  The skin margins were undermined minimally to an appropriate distance in all directions around the primary defect and laterally outward around the island pedicle utilizing iris scissors.  There was minimal undermining beneath the pedicle flap. Double Island Pedicle Flap Text: The defect edges were debeveled with a #15 scalpel blade.  Given the location of the defect, shape of the defect and the proximity to free margins a double island pedicle advancement flap was deemed most appropriate.  Using a sterile surgical marker, an appropriate advancement flap was drawn incorporating the defect, outlining the appropriate donor tissue and placing the expected incisions within the relaxed skin tension lines where possible.    The area thus outlined was incised deep to adipose tissue with a #15 scalpel blade.  The skin margins were undermined to an appropriate distance in all directions around the primary defect and laterally outward around the island pedicle utilizing iris scissors.  There was minimal undermining beneath the pedicle flap. Island Pedicle Flap-Requiring Vessel Identification Text: The defect edges were debeveled with a #15 scalpel blade.  Given the location of the defect, shape of the defect and the proximity to free margins an island pedicle advancement flap was deemed most appropriate.  Using a sterile surgical marker, an appropriate advancement flap was drawn, based on the axial vessel mentioned above, incorporating the defect, outlining the appropriate donor tissue and placing the expected incisions within the relaxed skin tension lines where possible.    The area thus outlined was incised deep to adipose tissue with a #15 scalpel blade.  The skin margins were undermined to an appropriate distance in all directions around the primary defect and laterally outward around the island pedicle utilizing iris scissors.  There was minimal undermining beneath the pedicle flap. Keystone Flap Text: The defect edges were debeveled with a #15 scalpel blade.  Given the location of the defect, shape of the defect a keystone flap was deemed most appropriate.  Using a sterile surgical marker, an appropriate keystone flap was drawn incorporating the defect, outlining the appropriate donor tissue and placing the expected incisions within the relaxed skin tension lines where possible. The area thus outlined was incised deep to adipose tissue with a #15 scalpel blade.  The skin margins were undermined to an appropriate distance in all directions around the primary defect and laterally outward around the flap utilizing iris scissors. O-T Plasty Text: The defect edges were debeveled with a #15 scalpel blade.  Given the location of the defect, shape of the defect and the proximity to free margins an O-T plasty was deemed most appropriate.  Using a sterile surgical marker, an appropriate O-T plasty was drawn incorporating the defect and placing the expected incisions within the relaxed skin tension lines where possible.    The area thus outlined was incised deep to adipose tissue with a #15 scalpel blade.  The skin margins were undermined to an appropriate distance in all directions utilizing iris scissors. O-Z Plasty Text: The defect edges were debeveled with a #15 scalpel blade.  Given the location of the defect, shape of the defect and the proximity to free margins an O-Z plasty (double transposition flap) was deemed most appropriate.  Using a sterile surgical marker, the appropriate transposition flaps were drawn incorporating the defect and placing the expected incisions within the relaxed skin tension lines where possible.    The area thus outlined was incised deep to adipose tissue with a #15 scalpel blade.  The skin margins were undermined to an appropriate distance in all directions utilizing iris scissors.  Hemostasis was achieved with electrocautery.  The flaps were then transposed into place, one clockwise and the other counterclockwise, and anchored with interrupted buried subcutaneous sutures. Double O-Z Plasty Text: The defect edges were debeveled with a #15 scalpel blade. Given the location of the defect, shape of the defect and the proximity to free margins a Double O-Z plasty (double transposition flap) was deemed most appropriate. Using a sterile surgical marker, the appropriate transposition flaps were drawn incorporating the defect and placing the expected incisions within the relaxed skin tension lines where possible. The area thus outlined was incised deep to adipose tissue with a #15 scalpel blade. The skin margins were undermined to an appropriate distance in all directions utilizing iris scissors. Hemostasis was achieved with electrocautery. The flaps were then transposed and carried over into place, one clockwise and the other counterclockwise, and anchored with interrupted buried subcutaneous sutures. V-Y Plasty Text: The defect edges were debeveled with a #15 scalpel blade.  Given the location of the defect, shape of the defect and the proximity to free margins an V-Y advancement flap was deemed most appropriate.  Using a sterile surgical marker, an appropriate advancement flap was drawn incorporating the defect and placing the expected incisions within the relaxed skin tension lines where possible.    The area thus outlined was incised deep to adipose tissue with a #15 scalpel blade.  The skin margins were undermined to an appropriate distance in all directions utilizing iris scissors. H Plasty Text: Given the location of the defect, shape of the defect and the proximity to free margins a H-plasty was deemed most appropriate for repair.  Using a sterile surgical marker, the appropriate advancement arms of the H-plasty were drawn incorporating the defect and placing the expected incisions within the relaxed skin tension lines where possible. The area thus outlined was incised deep to adipose tissue with a #15 scalpel blade. The skin margins were undermined to an appropriate distance in all directions utilizing iris scissors.  The opposing advancement arms were then advanced into place in opposite direction and anchored with interrupted buried subcutaneous sutures. W Plasty Text: The lesion was extirpated to the level of the fat with a #15 scalpel blade.  Given the location of the defect, shape of the defect and the proximity to free margins a W-plasty was deemed most appropriate for repair.  Using a sterile surgical marker, the appropriate transposition arms of the W-plasty were drawn incorporating the defect and placing the expected incisions within the relaxed skin tension lines where possible.    The area thus outlined was incised deep to adipose tissue with a #15 scalpel blade.  The skin margins were undermined to an appropriate distance in all directions utilizing iris scissors.  The opposing transposition arms were then transposed into place in opposite direction and anchored with interrupted buried subcutaneous sutures. Z Plasty Text: The lesion was extirpated to the level of the fat with a #15 scalpel blade.  Given the location of the defect, shape of the defect and the proximity to free margins a Z-plasty was deemed most appropriate for repair.  Using a sterile surgical marker, the appropriate transposition arms of the Z-plasty were drawn incorporating the defect and placing the expected incisions within the relaxed skin tension lines where possible.    The area thus outlined was incised deep to adipose tissue with a #15 scalpel blade.  The skin margins were undermined to an appropriate distance in all directions utilizing iris scissors.  The opposing transposition arms were then transposed into place in opposite direction and anchored with interrupted buried subcutaneous sutures. Double Z Plasty Text: The lesion was extirpated to the level of the fat with a #15 scalpel blade. Given the location of the defect, shape of the defect and the proximity to free margins a double Z-plasty was deemed most appropriate for repair. Using a sterile surgical marker, the appropriate transposition arms of the double Z-plasty were drawn incorporating the defect and placing the expected incisions within the relaxed skin tension lines where possible. The area thus outlined was incised deep to adipose tissue with a #15 scalpel blade. The skin margins were undermined to an appropriate distance in all directions utilizing iris scissors. The opposing transposition arms were then transposed and carried over into place in opposite direction and anchored with interrupted buried subcutaneous sutures. Zygomaticofacial Flap Text: Given the location of the defect, shape of the defect and the proximity to free margins a zygomaticofacial flap was deemed most appropriate for repair. Using a sterile surgical marker, the appropriate flap was drawn incorporating the defect and placing the expected incisions within the relaxed skin tension lines where possible. The area thus outlined was incised deep to adipose tissue with a #15 scalpel blade with preservation of a vascular pedicle.  The skin margins were undermined to an appropriate distance in all directions utilizing iris scissors. The flap was then carried over into the defect and anchored with interrupted buried subcutaneous sutures. Cheek Interpolation Flap Text: A decision was made to reconstruct the defect utilizing an interpolation axial flap and a staged reconstruction.  A telfa template was made of the defect.  This telfa template was then used to outline the Cheek Interpolation flap.  The donor area for the pedicle flap was then injected with anesthesia.  The flap was excised through the skin and subcutaneous tissue down to the layer of the underlying musculature.  The interpolation flap was carefully excised within this deep plane to maintain its blood supply.  The edges of the donor site were undermined.   The donor site was closed in a primary fashion.  The pedicle was then rotated into position and sutured.  Once the tube was sutured into place, adequate blood supply was confirmed with blanching and refill.  The pedicle was then wrapped with xeroform gauze and dressed appropriately with a telfa and gauze bandage to ensure continued blood supply and protect the attached pedicle. Cheek-To-Nose Interpolation Flap Text: A decision was made to reconstruct the defect utilizing an interpolation axial flap and a staged reconstruction.  A telfa template was made of the defect.  This telfa template was then used to outline the Cheek-To-Nose Interpolation flap.  The donor area for the pedicle flap was then injected with anesthesia.  The flap was excised through the skin and subcutaneous tissue down to the layer of the underlying musculature.  The interpolation flap was carefully excised within this deep plane to maintain its blood supply.  The edges of the donor site were undermined.   The donor site was closed in a primary fashion.  The pedicle was then rotated into position and sutured.  Once the tube was sutured into place, adequate blood supply was confirmed with blanching and refill.  The pedicle was then wrapped with xeroform gauze and dressed appropriately with a telfa and gauze bandage to ensure continued blood supply and protect the attached pedicle. Interpolation Flap Text: A decision was made to reconstruct the defect utilizing an interpolation axial flap and a staged reconstruction.  A telfa template was made of the defect.  This telfa template was then used to outline the interpolation flap.  The donor area for the pedicle flap was then injected with anesthesia.  The flap was excised through the skin and subcutaneous tissue down to the layer of the underlying musculature.  The interpolation flap was carefully excised within this deep plane to maintain its blood supply.  The edges of the donor site were undermined.   The donor site was closed in a primary fashion.  The pedicle was then rotated into position and sutured.  Once the tube was sutured into place, adequate blood supply was confirmed with blanching and refill.  The pedicle was then wrapped with xeroform gauze and dressed appropriately with a telfa and gauze bandage to ensure continued blood supply and protect the attached pedicle. Melolabial Interpolation Flap Text: A decision was made to reconstruct the defect utilizing an interpolation axial flap and a staged reconstruction.  A telfa template was made of the defect.  This telfa template was then used to outline the melolabial interpolation flap.  The donor area for the pedicle flap was then injected with anesthesia.  The flap was excised through the skin and subcutaneous tissue down to the layer of the underlying musculature.  The pedicle flap was carefully excised within this deep plane to maintain its blood supply.  The edges of the donor site were undermined.   The donor site was closed in a primary fashion.  The pedicle was then rotated into position and sutured.  Once the tube was sutured into place, adequate blood supply was confirmed with blanching and refill.  The pedicle was then wrapped with xeroform gauze and dressed appropriately with a telfa and gauze bandage to ensure continued blood supply and protect the attached pedicle. Mastoid Interpolation Flap Text: A decision was made to reconstruct the defect utilizing an interpolation axial flap and a staged reconstruction.  A telfa template was made of the defect.  This telfa template was then used to outline the mastoid interpolation flap.  The donor area for the pedicle flap was then injected with anesthesia.  The flap was excised through the skin and subcutaneous tissue down to the layer of the underlying musculature.  The pedicle flap was carefully excised within this deep plane to maintain its blood supply.  The edges of the donor site were undermined.   The donor site was closed in a primary fashion.  The pedicle was then rotated into position and sutured.  Once the tube was sutured into place, adequate blood supply was confirmed with blanching and refill.  The pedicle was then wrapped with xeroform gauze and dressed appropriately with a telfa and gauze bandage to ensure continued blood supply and protect the attached pedicle. Posterior Auricular Interpolation Flap Text: A decision was made to reconstruct the defect utilizing an interpolation axial flap and a staged reconstruction.  A telfa template was made of the defect.  This telfa template was then used to outline the posterior auricular interpolation flap.  The donor area for the pedicle flap was then injected with anesthesia.  The flap was excised through the skin and subcutaneous tissue down to the layer of the underlying musculature.  The pedicle flap was carefully excised within this deep plane to maintain its blood supply.  The edges of the donor site were undermined.   The donor site was closed in a primary fashion.  The pedicle was then rotated into position and sutured.  Once the tube was sutured into place, adequate blood supply was confirmed with blanching and refill.  The pedicle was then wrapped with xeroform gauze and dressed appropriately with a telfa and gauze bandage to ensure continued blood supply and protect the attached pedicle. Paramedian Forehead Flap Text: A decision was made to reconstruct the defect utilizing an interpolation axial flap and a staged reconstruction.  A telfa template was made of the defect.  This telfa template was then used to outline the paramedian forehead pedicle flap.  The donor area for the pedicle flap was then injected with anesthesia.  The flap was excised through the skin and subcutaneous tissue down to the layer of the underlying musculature.  The pedicle flap was carefully excised within this deep plane to maintain its blood supply.  The edges of the donor site were undermined.   The donor site was closed in a primary fashion.  The pedicle was then rotated into position and sutured.  Once the tube was sutured into place, adequate blood supply was confirmed with blanching and refill.  The pedicle was then wrapped with xeroform gauze and dressed appropriately with a telfa and gauze bandage to ensure continued blood supply and protect the attached pedicle. Abbe Flap (Upper To Lower Lip) Text: The defect of the lower lip was assessed and measured.  Given the location and size of the defect, an Abbe flap was deemed most appropriate. Using a sterile surgical marker, an appropriate Abbe flap was measured and drawn on the upper lip. Local anesthesia was then infiltrated.  A scalpel was then used to incise the upper lip through and through the skin, vermilion, muscle and mucosa, leaving the flap pedicled on the opposite side.  The flap was then rotated and transferred to the lower lip defect.  The flap was then sutured into place with a three layer technique, closing the orbicularis oris muscle layer with subcutaneous buried sutures, followed by a mucosal layer and an epidermal layer. Abbe Flap (Lower To Upper Lip) Text: The defect of the upper lip was assessed and measured.  Given the location and size of the defect, an Abbe flap was deemed most appropriate. Using a sterile surgical marker, an appropriate Abbe flap was measured and drawn on the lower lip. Local anesthesia was then infiltrated. A scalpel was then used to incise the upper lip through and through the skin, vermilion, muscle and mucosa, leaving the flap pedicled on the opposite side.  The flap was then rotated and transferred to the lower lip defect.  The flap was then sutured into place with a three layer technique, closing the orbicularis oris muscle layer with subcutaneous buried sutures, followed by a mucosal layer and an epidermal layer. Estlander Flap (Upper To Lower Lip) Text: The defect of the lower lip was assessed and measured.  Given the location and size of the defect, an Estlander flap was deemed most appropriate. Using a sterile surgical marker, an appropriate Estlander flap was measured and drawn on the upper lip. Local anesthesia was then infiltrated. A scalpel was then used to incise the lateral aspect of the flap, through skin, muscle and mucosa, leaving the flap pedicled medially.  The flap was then rotated and positioned to fill the lower lip defect.  The flap was then sutured into place with a three layer technique, closing the orbicularis oris muscle layer with subcutaneous buried sutures, followed by a mucosal layer and an epidermal layer. Lip Wedge Excision Repair Text: Given the location of the defect and the proximity to free margins a full thickness wedge repair was deemed most appropriate.  Using a sterile surgical marker, the appropriate repair was drawn incorporating the defect and placing the expected incisions perpendicular to the vermilion border.  The vermilion border was also meticulously outlined to ensure appropriate reapproximation during the repair.  The area thus outlined was incised through and through with a #15 scalpel blade.  The muscularis and dermis were reaproximated with deep sutures following hemostasis. Care was taken to realign the vermilion border before proceeding with the superficial closure.  Once the vermilion was realigned the superfical and mucosal closure was finished. Ftsg Text: The defect edges were debeveled with a #15 scalpel blade.  Given the location of the defect, shape of the defect and the proximity to free margins a full thickness skin graft was deemed most appropriate.  Using a sterile surgical marker, the primary defect shape was transferred to the donor site. The area thus outlined was incised deep to adipose tissue with a #15 scalpel blade.  The harvested graft was then trimmed of adipose tissue until only dermis and epidermis was left.  The skin margins of the secondary defect were undermined to an appropriate distance in all directions utilizing iris scissors.  The secondary defect was closed with interrupted buried subcutaneous sutures.  The skin edges were then re-apposed with running  sutures.  The skin graft was then placed in the primary defect and oriented appropriately. Split-Thickness Skin Graft Text: The defect edges were debeveled with a #15 scalpel blade.  Given the location of the defect, shape of the defect and the proximity to free margins a split thickness skin graft was deemed most appropriate.  Using a sterile surgical marker, the primary defect shape was transferred to the donor site. The split thickness graft was then harvested.  The skin graft was then placed in the primary defect and oriented appropriately. Pinch Graft Text: The defect edges were debeveled with a #15 scalpel blade. Given the location of the defect, shape of the defect and the proximity to free margins a pinch graft was deemed most appropriate. Using a sterile surgical marker, the primary defect shape was transferred to the donor site. The area thus outlined was incised deep to adipose tissue with a #15 scalpel blade.  The harvested graft was then trimmed of adipose tissue until only dermis and epidermis was left. The skin graft was then placed in the primary defect and oriented appropriately. Burow's Graft Text: The defect edges were debeveled with a #15 scalpel blade. Given the location of the defect, shape of the defect, the proximity to free margins and the presence of a standing cone deformity a Burow's skin graft was deemed most appropriate. The standing cone was removed and this tissue was then trimmed to the shape of the primary defect. The adipose tissue was also removed until only dermis and epidermis were left.  The skin graft was then placed in the primary defect and oriented appropriately. Cartilage Graft Text: The defect edges were debeveled with a #15 scalpel blade.  Given the location of the defect, shape of the defect, the fact the defect involved a full thickness cartilage defect a cartilage graft was deemed most appropriate.  An appropriate donor site was identified, cleansed, and anesthetized. The cartilage graft was then harvested and transferred to the recipient site, oriented appropriately and then sutured into place.  The secondary defect was then repaired using a primary closure. Composite Graft Text: The defect edges were debeveled with a #15 scalpel blade.  Given the location of the defect, shape of the defect, the proximity to free margins and the fact the defect was full thickness a composite graft was deemed most appropriate.  The defect was outline and then transferred to the donor site.  A full thickness graft was then excised from the donor site. The graft was then placed in the primary defect, oriented appropriately and then sutured into place.  The secondary defect was then repaired using a primary closure. Epidermal Autograft Text: The defect edges were debeveled with a #15 scalpel blade.  Given the location of the defect, shape of the defect and the proximity to free margins an epidermal autograft was deemed most appropriate.  Using a sterile surgical marker, the primary defect shape was transferred to the donor site. The epidermal graft was then harvested.  The skin graft was then placed in the primary defect and oriented appropriately. Dermal Autograft Text: The defect edges were debeveled with a #15 scalpel blade.  Given the location of the defect, shape of the defect and the proximity to free margins a dermal autograft was deemed most appropriate.  Using a sterile surgical marker, the primary defect shape was transferred to the donor site. The area thus outlined was incised deep to adipose tissue with a #15 scalpel blade.  The harvested graft was then trimmed of adipose and epidermal tissue until only dermis was left.  The skin graft was then placed in the primary defect and oriented appropriately. Skin Substitute Text: The defect edges were debeveled with a #15 scalpel blade.  Given the location of the defect, shape of the defect and the proximity to free margins a skin substitute graft was deemed most appropriate.  The graft material was trimmed to fit the size of the defect. The graft was then placed in the primary defect and oriented appropriately. Tissue Cultured Epidermal Autograft Text: The defect edges were debeveled with a #15 scalpel blade.  Given the location of the defect, shape of the defect and the proximity to free margins a tissue cultured epidermal autograft was deemed most appropriate.  The graft was then trimmed to fit the size of the defect.  The graft was then placed in the primary defect and oriented appropriately. Xenograft Text: The defect edges were debeveled with a #15 scalpel blade.  Given the location of the defect, shape of the defect and the proximity to free margins a xenograft was deemed most appropriate.  The graft was then trimmed to fit the size of the defect.  The graft was then placed in the primary defect and oriented appropriately. Purse String (Intermediate) Text: Given the location of the defect and the characteristics of the surrounding skin a purse string intermediate closure was deemed most appropriate.  Undermining was performed circumfirentially around the surgical defect.  A purse string suture was then placed and tightened. Purse String (Simple) Text: Given the location of the defect and the characteristics of the surrounding skin a purse string simple closure was deemed most appropriate.  Undermining was performed circumferentially around the surgical defect.  A purse string suture was then placed and tightened. Partial Purse String (Intermediate) Text: Given the location of the defect and the characteristics of the surrounding skin an intermediate purse string closure was deemed most appropriate.  Undermining was performed circumferentially around the surgical defect.  A purse string suture was then placed and tightened. Wound tension of the circular defect prevented complete closure of the wound. Partial Purse String (Simple) Text: Given the location of the defect and the characteristics of the surrounding skin a simple purse string closure was deemed most appropriate.  Undermining was performed circumferentially around the surgical defect.  A purse string suture was then placed and tightened. Wound tension of the circular defect prevented complete closure of the wound. Complex Repair And Single Advancement Flap Text: The defect edges were debeveled with a #15 scalpel blade.  The primary defect was closed partially with a complex linear closure.  Given the location of the remaining defect, shape of the defect and the proximity to free margins a single advancement flap was deemed most appropriate for complete closure of the defect.  Using a sterile surgical marker, an appropriate advancement flap was drawn incorporating the defect and placing the expected incisions within the relaxed skin tension lines where possible.    The area thus outlined was incised deep to adipose tissue with a #15 scalpel blade.  The skin margins were undermined to an appropriate distance in all directions utilizing iris scissors. Complex Repair And Double Advancement Flap Text: The defect edges were debeveled with a #15 scalpel blade.  The primary defect was closed partially with a complex linear closure.  Given the location of the remaining defect, shape of the defect and the proximity to free margins a double advancement flap was deemed most appropriate for complete closure of the defect.  Using a sterile surgical marker, an appropriate advancement flap was drawn incorporating the defect and placing the expected incisions within the relaxed skin tension lines where possible.    The area thus outlined was incised deep to adipose tissue with a #15 scalpel blade.  The skin margins were undermined to an appropriate distance in all directions utilizing iris scissors. Complex Repair And Modified Advancement Flap Text: The defect edges were debeveled with a #15 scalpel blade.  The primary defect was closed partially with a complex linear closure.  Given the location of the remaining defect, shape of the defect and the proximity to free margins a modified advancement flap was deemed most appropriate for complete closure of the defect.  Using a sterile surgical marker, an appropriate advancement flap was drawn incorporating the defect and placing the expected incisions within the relaxed skin tension lines where possible.    The area thus outlined was incised deep to adipose tissue with a #15 scalpel blade.  The skin margins were undermined to an appropriate distance in all directions utilizing iris scissors. Complex Repair And A-T Advancement Flap Text: The defect edges were debeveled with a #15 scalpel blade.  The primary defect was closed partially with a complex linear closure.  Given the location of the remaining defect, shape of the defect and the proximity to free margins an A-T advancement flap was deemed most appropriate for complete closure of the defect.  Using a sterile surgical marker, an appropriate advancement flap was drawn incorporating the defect and placing the expected incisions within the relaxed skin tension lines where possible.    The area thus outlined was incised deep to adipose tissue with a #15 scalpel blade.  The skin margins were undermined to an appropriate distance in all directions utilizing iris scissors. Complex Repair And O-T Advancement Flap Text: The defect edges were debeveled with a #15 scalpel blade.  The primary defect was closed partially with a complex linear closure.  Given the location of the remaining defect, shape of the defect and the proximity to free margins an O-T advancement flap was deemed most appropriate for complete closure of the defect.  Using a sterile surgical marker, an appropriate advancement flap was drawn incorporating the defect and placing the expected incisions within the relaxed skin tension lines where possible.    The area thus outlined was incised deep to adipose tissue with a #15 scalpel blade.  The skin margins were undermined to an appropriate distance in all directions utilizing iris scissors. Complex Repair And O-L Flap Text: The defect edges were debeveled with a #15 scalpel blade.  The primary defect was closed partially with a complex linear closure.  Given the location of the remaining defect, shape of the defect and the proximity to free margins an O-L flap was deemed most appropriate for complete closure of the defect.  Using a sterile surgical marker, an appropriate flap was drawn incorporating the defect and placing the expected incisions within the relaxed skin tension lines where possible.    The area thus outlined was incised deep to adipose tissue with a #15 scalpel blade.  The skin margins were undermined to an appropriate distance in all directions utilizing iris scissors. Complex Repair And Bilobe Flap Text: The defect edges were debeveled with a #15 scalpel blade.  The primary defect was closed partially with a complex linear closure.  Given the location of the remaining defect, shape of the defect and the proximity to free margins a bilobe flap was deemed most appropriate for complete closure of the defect.  Using a sterile surgical marker, an appropriate advancement flap was drawn incorporating the defect and placing the expected incisions within the relaxed skin tension lines where possible.    The area thus outlined was incised deep to adipose tissue with a #15 scalpel blade.  The skin margins were undermined to an appropriate distance in all directions utilizing iris scissors. Complex Repair And Melolabial Flap Text: The defect edges were debeveled with a #15 scalpel blade.  The primary defect was closed partially with a complex linear closure.  Given the location of the remaining defect, shape of the defect and the proximity to free margins a melolabial flap was deemed most appropriate for complete closure of the defect.  Using a sterile surgical marker, an appropriate advancement flap was drawn incorporating the defect and placing the expected incisions within the relaxed skin tension lines where possible.    The area thus outlined was incised deep to adipose tissue with a #15 scalpel blade.  The skin margins were undermined to an appropriate distance in all directions utilizing iris scissors. Complex Repair And Rotation Flap Text: The defect edges were debeveled with a #15 scalpel blade.  The primary defect was closed partially with a complex linear closure.  Given the location of the remaining defect, shape of the defect and the proximity to free margins a rotation flap was deemed most appropriate for complete closure of the defect.  Using a sterile surgical marker, an appropriate advancement flap was drawn incorporating the defect and placing the expected incisions within the relaxed skin tension lines where possible.    The area thus outlined was incised deep to adipose tissue with a #15 scalpel blade.  The skin margins were undermined to an appropriate distance in all directions utilizing iris scissors. Complex Repair And Rhombic Flap Text: The defect edges were debeveled with a #15 scalpel blade.  The primary defect was closed partially with a complex linear closure.  Given the location of the remaining defect, shape of the defect and the proximity to free margins a rhombic flap was deemed most appropriate for complete closure of the defect.  Using a sterile surgical marker, an appropriate advancement flap was drawn incorporating the defect and placing the expected incisions within the relaxed skin tension lines where possible.    The area thus outlined was incised deep to adipose tissue with a #15 scalpel blade.  The skin margins were undermined to an appropriate distance in all directions utilizing iris scissors. Complex Repair And Transposition Flap Text: The defect edges were debeveled with a #15 scalpel blade.  The primary defect was closed partially with a complex linear closure.  Given the location of the remaining defect, shape of the defect and the proximity to free margins a transposition flap was deemed most appropriate for complete closure of the defect.  Using a sterile surgical marker, an appropriate advancement flap was drawn incorporating the defect and placing the expected incisions within the relaxed skin tension lines where possible.    The area thus outlined was incised deep to adipose tissue with a #15 scalpel blade.  The skin margins were undermined to an appropriate distance in all directions utilizing iris scissors. Complex Repair And V-Y Plasty Text: The defect edges were debeveled with a #15 scalpel blade.  The primary defect was closed partially with a complex linear closure.  Given the location of the remaining defect, shape of the defect and the proximity to free margins a V-Y plasty was deemed most appropriate for complete closure of the defect.  Using a sterile surgical marker, an appropriate advancement flap was drawn incorporating the defect and placing the expected incisions within the relaxed skin tension lines where possible.    The area thus outlined was incised deep to adipose tissue with a #15 scalpel blade.  The skin margins were undermined to an appropriate distance in all directions utilizing iris scissors. Complex Repair And M Plasty Text: The defect edges were debeveled with a #15 scalpel blade.  The primary defect was closed partially with a complex linear closure.  Given the location of the remaining defect, shape of the defect and the proximity to free margins an M plasty was deemed most appropriate for complete closure of the defect.  Using a sterile surgical marker, an appropriate advancement flap was drawn incorporating the defect and placing the expected incisions within the relaxed skin tension lines where possible.    The area thus outlined was incised deep to adipose tissue with a #15 scalpel blade.  The skin margins were undermined to an appropriate distance in all directions utilizing iris scissors. Complex Repair And Double M Plasty Text: The defect edges were debeveled with a #15 scalpel blade.  The primary defect was closed partially with a complex linear closure.  Given the location of the remaining defect, shape of the defect and the proximity to free margins a double M plasty was deemed most appropriate for complete closure of the defect.  Using a sterile surgical marker, an appropriate advancement flap was drawn incorporating the defect and placing the expected incisions within the relaxed skin tension lines where possible.    The area thus outlined was incised deep to adipose tissue with a #15 scalpel blade.  The skin margins were undermined to an appropriate distance in all directions utilizing iris scissors. Complex Repair And W Plasty Text: The defect edges were debeveled with a #15 scalpel blade.  The primary defect was closed partially with a complex linear closure.  Given the location of the remaining defect, shape of the defect and the proximity to free margins a W plasty was deemed most appropriate for complete closure of the defect.  Using a sterile surgical marker, an appropriate advancement flap was drawn incorporating the defect and placing the expected incisions within the relaxed skin tension lines where possible.    The area thus outlined was incised deep to adipose tissue with a #15 scalpel blade.  The skin margins were undermined to an appropriate distance in all directions utilizing iris scissors. Complex Repair And Z Plasty Text: The defect edges were debeveled with a #15 scalpel blade.  The primary defect was closed partially with a complex linear closure.  Given the location of the remaining defect, shape of the defect and the proximity to free margins a Z plasty was deemed most appropriate for complete closure of the defect.  Using a sterile surgical marker, an appropriate advancement flap was drawn incorporating the defect and placing the expected incisions within the relaxed skin tension lines where possible.    The area thus outlined was incised deep to adipose tissue with a #15 scalpel blade.  The skin margins were undermined to an appropriate distance in all directions utilizing iris scissors. Complex Repair And Dorsal Nasal Flap Text: The defect edges were debeveled with a #15 scalpel blade.  The primary defect was closed partially with a complex linear closure.  Given the location of the remaining defect, shape of the defect and the proximity to free margins a dorsal nasal flap was deemed most appropriate for complete closure of the defect.  Using a sterile surgical marker, an appropriate flap was drawn incorporating the defect and placing the expected incisions within the relaxed skin tension lines where possible.    The area thus outlined was incised deep to adipose tissue with a #15 scalpel blade.  The skin margins were undermined to an appropriate distance in all directions utilizing iris scissors. Complex Repair And Ftsg Text: The defect edges were debeveled with a #15 scalpel blade.  The primary defect was closed partially with a complex linear closure.  Given the location of the defect, shape of the defect and the proximity to free margins a full thickness skin graft was deemed most appropriate to repair the remaining defect.  The graft was trimmed to fit the size of the remaining defect.  The graft was then placed in the primary defect, oriented appropriately, and sutured into place. Complex Repair And Burow's Graft Text: The defect edges were debeveled with a #15 scalpel blade.  The primary defect was closed partially with a complex linear closure.  Given the location of the defect, shape of the defect, the proximity to free margins and the presence of a standing cone deformity a Burow's graft was deemed most appropriate to repair the remaining defect.  The graft was trimmed to fit the size of the remaining defect.  The graft was then placed in the primary defect, oriented appropriately, and sutured into place. Complex Repair And Split-Thickness Skin Graft Text: The defect edges were debeveled with a #15 scalpel blade.  The primary defect was closed partially with a complex linear closure.  Given the location of the defect, shape of the defect and the proximity to free margins a split thickness skin graft was deemed most appropriate to repair the remaining defect.  The graft was trimmed to fit the size of the remaining defect.  The graft was then placed in the primary defect, oriented appropriately, and sutured into place. Complex Repair And Epidermal Autograft Text: The defect edges were debeveled with a #15 scalpel blade.  The primary defect was closed partially with a complex linear closure.  Given the location of the defect, shape of the defect and the proximity to free margins an epidermal autograft was deemed most appropriate to repair the remaining defect.  The graft was trimmed to fit the size of the remaining defect.  The graft was then placed in the primary defect, oriented appropriately, and sutured into place. Complex Repair And Dermal Autograft Text: The defect edges were debeveled with a #15 scalpel blade.  The primary defect was closed partially with a complex linear closure.  Given the location of the defect, shape of the defect and the proximity to free margins an dermal autograft was deemed most appropriate to repair the remaining defect.  The graft was trimmed to fit the size of the remaining defect.  The graft was then placed in the primary defect, oriented appropriately, and sutured into place. Complex Repair And Tissue Cultured Epidermal Autograft Text: The defect edges were debeveled with a #15 scalpel blade.  The primary defect was closed partially with a complex linear closure.  Given the location of the defect, shape of the defect and the proximity to free margins an tissue cultured epidermal autograft was deemed most appropriate to repair the remaining defect.  The graft was trimmed to fit the size of the remaining defect.  The graft was then placed in the primary defect, oriented appropriately, and sutured into place. Complex Repair And Xenograft Text: The defect edges were debeveled with a #15 scalpel blade.  The primary defect was closed partially with a complex linear closure.  Given the location of the defect, shape of the defect and the proximity to free margins a xenograft was deemed most appropriate to repair the remaining defect.  The graft was trimmed to fit the size of the remaining defect.  The graft was then placed in the primary defect, oriented appropriately, and sutured into place. Complex Repair And Skin Substitute Graft Text: The defect edges were debeveled with a #15 scalpel blade.  The primary defect was closed partially with a complex linear closure.  Given the location of the remaining defect, shape of the defect and the proximity to free margins a skin substitute graft was deemed most appropriate to repair the remaining defect.  The graft was trimmed to fit the size of the remaining defect.  The graft was then placed in the primary defect, oriented appropriately, and sutured into place. Path Notes (To The Dermatopathologist): Please check margins. Consent was obtained from the patient. The risks and benefits to therapy were discussed in detail. Specifically, the risks of infection, scarring, bleeding, prolonged wound healing, incomplete removal, allergy to anesthesia, nerve injury and recurrence were addressed. Prior to the procedure, the treatment site was clearly identified and confirmed by the patient. All components of Universal Protocol/PAUSE Rule completed. Post-Care Instructions: I reviewed with the patient in detail post-care instructions:\\n1. Apply bacitracin over the scar line. \\n2. Cut non-stick pad (Telfa) to cover the ointment and scar line \\n3. Apply tape (hypafix) over the non-stick pad\\n4. Change once per day for 8 days\\n5. Shower with bandage on, change bandage after shower\\n\\nPatient is not to engage in any heavy lifting, exercise, hot tub, or swimming for the next 14 days. Should the patient develop any fevers, chills, bleeding, severe pain patient will contact the office immediately. Home Suture Removal Text: Patient was provided a home suture removal kit and will remove their sutures at home.  If they have any questions or difficulties they will call the office. Where Do You Want The Question To Include Opioid Counseling Located?: Case Summary Tab Information: Selecting Yes will display possible errors in your note based on the variables you have selected. This validation is only offered as a suggestion for you. PLEASE NOTE THAT THE VALIDATION TEXT WILL BE REMOVED WHEN YOU FINALIZE YOUR NOTE. IF YOU WANT TO FAX A PRELIMINARY NOTE YOU WILL NEED TO TOGGLE THIS TO 'NO' IF YOU DO NOT WANT IT IN YOUR FAXED NOTE.

## 2024-12-24 PROBLEM — F10.90 ALCOHOL USE: Status: ACTIVE | Noted: 2021-06-11

## 2025-06-10 ENCOUNTER — APPOINTMENT (OUTPATIENT)
Dept: INTERNAL MEDICINE | Facility: CLINIC | Age: 65
End: 2025-06-10

## 2025-06-11 ENCOUNTER — APPOINTMENT (OUTPATIENT)
Dept: PULMONOLOGY | Facility: CLINIC | Age: 65
End: 2025-06-11